# Patient Record
Sex: MALE | Race: WHITE | Employment: FULL TIME | URBAN - NONMETROPOLITAN AREA
[De-identification: names, ages, dates, MRNs, and addresses within clinical notes are randomized per-mention and may not be internally consistent; named-entity substitution may affect disease eponyms.]

---

## 2017-07-30 ENCOUNTER — APPOINTMENT (OUTPATIENT)
Dept: GENERAL RADIOLOGY | Age: 42
End: 2017-07-30

## 2017-07-30 ENCOUNTER — APPOINTMENT (OUTPATIENT)
Dept: CT IMAGING | Age: 42
End: 2017-07-30

## 2017-07-30 ENCOUNTER — HOSPITAL ENCOUNTER (EMERGENCY)
Age: 42
Discharge: HOME | End: 2017-07-30

## 2017-07-30 VITALS
OXYGEN SATURATION: 97 % | HEART RATE: 78 BPM | WEIGHT: 210 LBS | TEMPERATURE: 98.9 F | HEIGHT: 71 IN | DIASTOLIC BLOOD PRESSURE: 88 MMHG | SYSTOLIC BLOOD PRESSURE: 125 MMHG | RESPIRATION RATE: 16 BRPM | BODY MASS INDEX: 29.4 KG/M2

## 2017-07-30 DIAGNOSIS — R10.84 GENERALIZED ABDOMINAL PAIN: Primary | ICD-10-CM

## 2017-07-30 LAB
ABSOLUTE EOS #: 0.1 K/UL (ref 0–0.4)
ABSOLUTE LYMPH #: 1.7 K/UL (ref 1–4.8)
ABSOLUTE MONO #: 0.7 K/UL (ref 0–1)
ALBUMIN SERPL-MCNC: 4.5 G/DL (ref 3.5–5.2)
ALBUMIN/GLOBULIN RATIO: 1.3 (ref 1–2.5)
ALP BLD-CCNC: 52 U/L (ref 40–129)
ALT SERPL-CCNC: 47 U/L (ref 5–41)
ANION GAP SERPL CALCULATED.3IONS-SCNC: 15 MMOL/L (ref 9–17)
AST SERPL-CCNC: 29 U/L
BASOPHILS # BLD: 0 %
BASOPHILS ABSOLUTE: 0 K/UL (ref 0–0.2)
BILIRUB SERPL-MCNC: 0.41 MG/DL (ref 0.3–1.2)
BILIRUBIN URINE: NEGATIVE
BUN BLDV-MCNC: 14 MG/DL (ref 6–20)
BUN/CREAT BLD: 14 (ref 9–20)
CALCIUM SERPL-MCNC: 9.4 MG/DL (ref 8.6–10.4)
CHLORIDE BLD-SCNC: 97 MMOL/L (ref 98–107)
CO2: 23 MMOL/L (ref 20–31)
COLOR: YELLOW
COMMENT UA: NORMAL
CREAT SERPL-MCNC: 0.97 MG/DL (ref 0.7–1.2)
DIFFERENTIAL TYPE: ABNORMAL
EOSINOPHILS RELATIVE PERCENT: 1 %
GFR AFRICAN AMERICAN: >60 ML/MIN
GFR NON-AFRICAN AMERICAN: >60 ML/MIN
GFR SERPL CREATININE-BSD FRML MDRD: ABNORMAL ML/MIN/{1.73_M2}
GFR SERPL CREATININE-BSD FRML MDRD: ABNORMAL ML/MIN/{1.73_M2}
GLUCOSE BLD-MCNC: 115 MG/DL (ref 70–99)
GLUCOSE URINE: NEGATIVE
HCT VFR BLD CALC: 46.6 % (ref 41–53)
HEMOGLOBIN: 15.9 G/DL (ref 13.5–17)
KETONES, URINE: NEGATIVE
LEUKOCYTE ESTERASE, URINE: NEGATIVE
LIPASE: 16 U/L (ref 13–60)
LYMPHOCYTES # BLD: 12 %
MCH RBC QN AUTO: 31.8 PG (ref 26–34)
MCHC RBC AUTO-ENTMCNC: 34.2 G/DL (ref 31–37)
MCV RBC AUTO: 93.2 FL (ref 80–100)
MONOCYTES # BLD: 5 %
NITRITE, URINE: NEGATIVE
PDW BLD-RTO: 12.8 % (ref 12.1–15.2)
PH UA: 7 (ref 5–9)
PLATELET # BLD: 204 K/UL (ref 140–450)
PLATELET ESTIMATE: ABNORMAL
PMV BLD AUTO: 8.2 FL (ref 6–12)
POTASSIUM SERPL-SCNC: 4.4 MMOL/L (ref 3.7–5.3)
PROTEIN UA: NEGATIVE
RBC # BLD: 5 M/UL (ref 4.5–5.9)
RBC # BLD: ABNORMAL 10*6/UL
SEG NEUTROPHILS: 82 %
SEGMENTED NEUTROPHILS ABSOLUTE COUNT: 12.2 K/UL (ref 1.8–7.7)
SODIUM BLD-SCNC: 135 MMOL/L (ref 135–144)
SPECIFIC GRAVITY UA: 1.01 (ref 1.01–1.02)
TOTAL PROTEIN: 8 G/DL (ref 6.4–8.3)
TURBIDITY: CLEAR
URINE HGB: NEGATIVE
UROBILINOGEN, URINE: NORMAL
WBC # BLD: 14.7 K/UL (ref 3.5–11)
WBC # BLD: ABNORMAL 10*3/UL

## 2017-07-30 PROCEDURE — 83690 ASSAY OF LIPASE: CPT

## 2017-07-30 PROCEDURE — 74176 CT ABD & PELVIS W/O CONTRAST: CPT

## 2017-07-30 PROCEDURE — 99284 EMERGENCY DEPT VISIT MOD MDM: CPT

## 2017-07-30 PROCEDURE — 36415 COLL VENOUS BLD VENIPUNCTURE: CPT

## 2017-07-30 PROCEDURE — 81003 URINALYSIS AUTO W/O SCOPE: CPT

## 2017-07-30 PROCEDURE — 85025 COMPLETE CBC W/AUTO DIFF WBC: CPT

## 2017-07-30 PROCEDURE — 80053 COMPREHEN METABOLIC PANEL: CPT

## 2017-07-30 ASSESSMENT — PAIN DESCRIPTION - FREQUENCY: FREQUENCY: INTERMITTENT

## 2017-07-30 ASSESSMENT — PAIN DESCRIPTION - LOCATION: LOCATION: ABDOMEN

## 2017-07-30 ASSESSMENT — ENCOUNTER SYMPTOMS
EYE DISCHARGE: 0
DIARRHEA: 0
TROUBLE SWALLOWING: 0
COUGH: 0
SINUS PRESSURE: 0
ABDOMINAL PAIN: 1
RHINORRHEA: 0
WHEEZING: 0
NAUSEA: 0
BACK PAIN: 0
VOMITING: 0
EYE PAIN: 0

## 2017-07-30 ASSESSMENT — PAIN DESCRIPTION - PAIN TYPE: TYPE: ACUTE PAIN

## 2017-07-30 ASSESSMENT — PAIN SCALES - GENERAL: PAINLEVEL_OUTOF10: 7

## 2017-07-30 ASSESSMENT — PAIN DESCRIPTION - ORIENTATION: ORIENTATION: LEFT;LOWER

## 2017-07-30 ASSESSMENT — PAIN DESCRIPTION - DESCRIPTORS: DESCRIPTORS: CRAMPING

## 2017-07-30 ASSESSMENT — PAIN DESCRIPTION - ONSET: ONSET: GRADUAL

## 2021-04-02 ENCOUNTER — HOSPITAL ENCOUNTER (EMERGENCY)
Age: 46
Discharge: HOME | End: 2021-04-02
Attending: EMERGENCY MEDICINE
Payer: COMMERCIAL

## 2021-04-02 ENCOUNTER — APPOINTMENT (OUTPATIENT)
Dept: GENERAL RADIOLOGY | Age: 46
End: 2021-04-02
Payer: COMMERCIAL

## 2021-04-02 VITALS
SYSTOLIC BLOOD PRESSURE: 140 MMHG | TEMPERATURE: 96.2 F | RESPIRATION RATE: 18 BRPM | OXYGEN SATURATION: 97 % | HEART RATE: 68 BPM | DIASTOLIC BLOOD PRESSURE: 100 MMHG

## 2021-04-02 DIAGNOSIS — F10.20 ALCOHOL DEPENDENCE, UNCOMPLICATED (HCC): Primary | ICD-10-CM

## 2021-04-02 LAB
ABSOLUTE EOS #: 0.17 K/UL (ref 0–0.44)
ABSOLUTE IMMATURE GRANULOCYTE: 0.07 K/UL (ref 0–0.3)
ABSOLUTE LYMPH #: 2.13 K/UL (ref 1.1–3.7)
ABSOLUTE MONO #: 0.78 K/UL (ref 0.1–1.2)
ALBUMIN SERPL-MCNC: 4.4 G/DL (ref 3.5–5.2)
ALBUMIN/GLOBULIN RATIO: 1.5 (ref 1–2.5)
ALP BLD-CCNC: 64 U/L (ref 40–129)
ALT SERPL-CCNC: 31 U/L (ref 5–41)
ANION GAP SERPL CALCULATED.3IONS-SCNC: 10 MMOL/L (ref 9–17)
AST SERPL-CCNC: 24 U/L
BASOPHILS # BLD: 0 % (ref 0–2)
BASOPHILS ABSOLUTE: 0.03 K/UL (ref 0–0.2)
BILIRUB SERPL-MCNC: 0.23 MG/DL (ref 0.3–1.2)
BILIRUBIN URINE: NEGATIVE
BUN BLDV-MCNC: 18 MG/DL (ref 6–20)
BUN/CREAT BLD: 19 (ref 9–20)
CALCIUM SERPL-MCNC: 9.5 MG/DL (ref 8.6–10.4)
CHLORIDE BLD-SCNC: 100 MMOL/L (ref 98–107)
CO2: 25 MMOL/L (ref 20–31)
COLOR: YELLOW
COMMENT UA: ABNORMAL
CREAT SERPL-MCNC: 0.96 MG/DL (ref 0.7–1.2)
D-DIMER QUANTITATIVE: 0.28 MG/L FEU (ref 0–0.59)
DIFFERENTIAL TYPE: ABNORMAL
EKG ATRIAL RATE: 67 BPM
EKG P AXIS: 16 DEGREES
EKG P-R INTERVAL: 170 MS
EKG Q-T INTERVAL: 410 MS
EKG QRS DURATION: 94 MS
EKG QTC CALCULATION (BAZETT): 433 MS
EKG R AXIS: 19 DEGREES
EKG T AXIS: 30 DEGREES
EKG VENTRICULAR RATE: 67 BPM
EOSINOPHILS RELATIVE PERCENT: 2 % (ref 1–4)
GFR AFRICAN AMERICAN: >60 ML/MIN
GFR NON-AFRICAN AMERICAN: >60 ML/MIN
GFR SERPL CREATININE-BSD FRML MDRD: ABNORMAL ML/MIN/{1.73_M2}
GFR SERPL CREATININE-BSD FRML MDRD: ABNORMAL ML/MIN/{1.73_M2}
GLUCOSE BLD-MCNC: 154 MG/DL (ref 70–99)
GLUCOSE URINE: NEGATIVE
HCT VFR BLD CALC: 42.7 % (ref 40.7–50.3)
HEMOGLOBIN: 14.7 G/DL (ref 13–17)
IMMATURE GRANULOCYTES: 1 %
KETONES, URINE: NEGATIVE
LEUKOCYTE ESTERASE, URINE: NEGATIVE
LIPASE: 22 U/L (ref 13–60)
LYMPHOCYTES # BLD: 27 % (ref 24–43)
MCH RBC QN AUTO: 32.2 PG (ref 25.2–33.5)
MCHC RBC AUTO-ENTMCNC: 34.4 G/DL (ref 28.4–34.8)
MCV RBC AUTO: 93.4 FL (ref 82.6–102.9)
MONOCYTES # BLD: 10 % (ref 3–12)
NITRITE, URINE: NEGATIVE
NRBC AUTOMATED: 0 PER 100 WBC
PDW BLD-RTO: 11.9 % (ref 11.8–14.4)
PH UA: 5.5 (ref 5–9)
PLATELET # BLD: 213 K/UL (ref 138–453)
PLATELET ESTIMATE: ABNORMAL
PMV BLD AUTO: 10.2 FL (ref 8.1–13.5)
POTASSIUM SERPL-SCNC: 4.1 MMOL/L (ref 3.7–5.3)
PROTEIN UA: NEGATIVE
RBC # BLD: 4.57 M/UL (ref 4.21–5.77)
RBC # BLD: ABNORMAL 10*6/UL
SEG NEUTROPHILS: 60 % (ref 36–65)
SEGMENTED NEUTROPHILS ABSOLUTE COUNT: 4.68 K/UL (ref 1.5–8.1)
SODIUM BLD-SCNC: 135 MMOL/L (ref 135–144)
SPECIFIC GRAVITY UA: >1.03 (ref 1.01–1.02)
TOTAL PROTEIN: 7.4 G/DL (ref 6.4–8.3)
TURBIDITY: CLEAR
URINE HGB: NEGATIVE
UROBILINOGEN, URINE: NORMAL
WBC # BLD: 7.9 K/UL (ref 3.5–11.3)
WBC # BLD: ABNORMAL 10*3/UL

## 2021-04-02 PROCEDURE — 71045 X-RAY EXAM CHEST 1 VIEW: CPT

## 2021-04-02 PROCEDURE — 99285 EMERGENCY DEPT VISIT HI MDM: CPT

## 2021-04-02 PROCEDURE — 80053 COMPREHEN METABOLIC PANEL: CPT

## 2021-04-02 PROCEDURE — 93010 ELECTROCARDIOGRAM REPORT: CPT | Performed by: INTERNAL MEDICINE

## 2021-04-02 PROCEDURE — 85379 FIBRIN DEGRADATION QUANT: CPT

## 2021-04-02 PROCEDURE — 36415 COLL VENOUS BLD VENIPUNCTURE: CPT

## 2021-04-02 PROCEDURE — 81003 URINALYSIS AUTO W/O SCOPE: CPT

## 2021-04-02 PROCEDURE — 83690 ASSAY OF LIPASE: CPT

## 2021-04-02 PROCEDURE — 93005 ELECTROCARDIOGRAM TRACING: CPT | Performed by: EMERGENCY MEDICINE

## 2021-04-02 PROCEDURE — 85025 COMPLETE CBC W/AUTO DIFF WBC: CPT

## 2021-04-02 ASSESSMENT — ENCOUNTER SYMPTOMS
ALLERGIC/IMMUNOLOGIC NEGATIVE: 1
GASTROINTESTINAL NEGATIVE: 1
RESPIRATORY NEGATIVE: 1
EYES NEGATIVE: 1

## 2021-04-02 ASSESSMENT — PAIN DESCRIPTION - LOCATION: LOCATION: ABDOMEN

## 2021-04-02 ASSESSMENT — PAIN DESCRIPTION - DESCRIPTORS: DESCRIPTORS: ACHING

## 2021-04-02 ASSESSMENT — PAIN SCALES - GENERAL: PAINLEVEL_OUTOF10: 7

## 2021-04-02 ASSESSMENT — PAIN DESCRIPTION - PAIN TYPE: TYPE: ACUTE PAIN

## 2021-04-02 ASSESSMENT — PAIN DESCRIPTION - ORIENTATION: ORIENTATION: RIGHT;LEFT

## 2021-04-02 NOTE — ED PROVIDER NOTES
[address] [place] [address] ED  Emergency Department     Care of Gisela Huang was assumed from previous ED provider. The patient's initial evaluation and plan have been discussed with the ED prior provider who initially cared for the patient . All pertinent data has been reviewed. Time of signout is 0700. This patient is a 39 y.o. Male with bilateral episodic flank pain, currently completely symptom-free. Presents today with some intermittent episodes of bilateral flank discomfort which is very intermittent and seems to be associated with movement. No fever, no chills, no hematuria or dysuria, no chest pain, no shortness of breath, no hemoptysis or any other associated symptoms. The patient had labs and EKG and chest x-ray ordered and I am asked to follow-up on these tests    On my examination, patient is pleasant, not in any acute distress, denies any complaints currently. He does describe that he drinks 6 beers a day and has done so for a long time. He uses to help him fall asleep and is somewhat anxious about cutting down, although he feels that it is time to do so. He denies any dark or bloody stools. Denies any other associated symptoms. He has not had any weight loss    HEENT: WNL  Lungs: Clear and equal bilaterally. No increased work of breathing or respiratory distress. Heart: Rate and rhythm regular, no murmurs clicks or gallops  Abdomen: Soft, nondistended, nontender; flank and back is nontender on palpation bilaterally with no lesions  Lower extremities: no edema, negative Homans bilaterally  Neuro: Awake and alert, no lateralizing deficits, cranial nerves II through XII grossly intact bilaterally    EMERGENCY DEPARTMENT COURSE / MEDICAL DECISION MAKING:       MEDICATIONS GIVEN:  No orders of the defined types were placed in this encounter.     LABS / RADIOLOGY:     Results for orders placed or performed during the hospital encounter of 04/02/21   CBC Auto Differential   Result Value Ref Range    WBC 7.9 3.5 - 11.3 k/uL    RBC 4.57 4.21 - 5.77 m/uL    Hemoglobin 14.7 13.0 - 17.0 g/dL    Hematocrit 42.7 40.7 - 50.3 %    MCV 93.4 82.6 - 102.9 fL    MCH 32.2 25.2 - 33.5 pg    MCHC 34.4 28.4 - 34.8 g/dL    RDW 11.9 11.8 - 14.4 %    Platelets 475 734 - 111 k/uL    MPV 10.2 8.1 - 13.5 fL    NRBC Automated 0.0 0.0 per 100 WBC    Differential Type NOT REPORTED     Seg Neutrophils 60 36 - 65 %    Lymphocytes 27 24 - 43 %    Monocytes 10 3 - 12 %    Eosinophils % 2 1 - 4 %    Basophils 0 0 - 2 %    Immature Granulocytes 1 (H) 0 %    Segs Absolute 4.68 1.50 - 8.10 k/uL    Absolute Lymph # 2.13 1.10 - 3.70 k/uL    Absolute Mono # 0.78 0.10 - 1.20 k/uL    Absolute Eos # 0.17 0.00 - 0.44 k/uL    Basophils Absolute 0.03 0.00 - 0.20 k/uL    Absolute Immature Granulocyte 0.07 0.00 - 0.30 k/uL    WBC Morphology NOT REPORTED     RBC Morphology NOT REPORTED     Platelet Estimate NOT REPORTED    Comprehensive Metabolic Panel w/ Reflex to MG   Result Value Ref Range    Glucose 154 (H) 70 - 99 mg/dL    BUN 18 6 - 20 mg/dL    CREATININE 0.96 0.70 - 1.20 mg/dL    Bun/Cre Ratio 19 9 - 20    Calcium 9.5 8.6 - 10.4 mg/dL    Sodium 135 135 - 144 mmol/L    Potassium 4.1 3.7 - 5.3 mmol/L    Chloride 100 98 - 107 mmol/L    CO2 25 20 - 31 mmol/L    Anion Gap 10 9 - 17 mmol/L    Alkaline Phosphatase 64 40 - 129 U/L    ALT 31 5 - 41 U/L    AST 24 <40 U/L    Total Bilirubin 0.23 (L) 0.3 - 1.2 mg/dL    Total Protein 7.4 6.4 - 8.3 g/dL    Albumin 4.4 3.5 - 5.2 g/dL    Albumin/Globulin Ratio 1.5 1.0 - 2.5    GFR Non-African American >60 >60 mL/min    GFR African American >60 >60 mL/min    GFR Comment          GFR Staging         Lipase   Result Value Ref Range    Lipase 22 13 - 60 U/L   D-Dimer, Quantitative   Result Value Ref Range    D-Dimer, Quant 0.28 0.00 - 0.59 mg/L FEU   Urinalysis, reflex to microscopic   Result Value Ref Range    Color, UA YELLOW YELLOW    Turbidity UA CLEAR CLEAR    Glucose, Ur NEGATIVE NEGATIVE    Bilirubin follow-up for further evaluation and return if he develops any worsening of his symptoms, persistency of symptoms, or any red flags such as dark or bloody stools, coughing up blood, shortness of breath, chest pain, unintentional weight loss. CLINICAL IMPRESSION      1.  Alcohol dependence, uncomplicated [facility])          DISPOSITION/PLAN   DISPOSITION Decision To Discharge 04/02/2021 07:22:27 AM      PATIENT REFERRED TO:  [place] [id number] [phone]            DISCHARGE MEDICATIONS:  New Prescriptions    No medications on file              (Please note that portions of this note were completed with a voice recognition program.  Efforts were made to edit the dictations but occasionally words are mis-transcribed.)      James Mcmahon DO (electronically signed)  Attending Emergency Physician          Arvind Mckinley DO  04/02/21 2781

## 2021-04-02 NOTE — ED PROVIDER NOTES
Pulse Resp SpO2 Height Weight   (!) 153/93 96.2 °F (35.7 °C) -- 68 18 97 % -- --       Physical Exam  Vitals signs and nursing note reviewed. Constitutional:       Appearance: He is well-developed. HENT:      Head: Normocephalic and atraumatic. Mouth/Throat:      Mouth: Mucous membranes are moist.   Cardiovascular:      Rate and Rhythm: Normal rate and regular rhythm. Heart sounds: Normal heart sounds. No murmur. Pulmonary:      Effort: Pulmonary effort is normal.      Breath sounds: Normal breath sounds. Abdominal:      General: Abdomen is protuberant. Bowel sounds are normal. There is no abdominal bruit. There are no signs of injury. Palpations: Abdomen is soft. There is no shifting dullness, fluid wave, hepatomegaly, splenomegaly, mass or pulsatile mass. Tenderness: There is abdominal tenderness (Bilateral flank). There is right CVA tenderness and left CVA tenderness. Comments: Tender Holt bilateral flank regions-negative Cullens  sign   Skin:     General: Skin is warm. Capillary Refill: Capillary refill takes less than 2 seconds. Findings: No erythema or rash. Neurological:      General: No focal deficit present. Mental Status: He is alert. DIAGNOSTIC RESULTS     EKG: All EKG's are interpreted by the Emergency Department Physician who either signs or Co-signs this chart in the absence of a cardiologist.      RADIOLOGY:   Non-plain film images such as CT, Ultrasound and MRI are read by the radiologist. Plain radiographic images are visualized and preliminarily interpreted by the emergency physician with the below findings:      Interpretation per the Radiologist below, if available at the time of this note:    XR CHEST PORTABLE   Preliminary Result   No acute cardiopulmonary disease.                ED BEDSIDE ULTRASOUND:   Performed by ED Physician - none    LABS:  Labs Reviewed   CBC WITH AUTO DIFFERENTIAL - Abnormal; Notable for the following components:       Result Value    Immature Granulocytes 1 (*)     All other components within normal limits   COMPREHENSIVE METABOLIC PANEL W/ REFLEX TO MG FOR LOW K - Abnormal; Notable for the following components:    Glucose 154 (*)     Total Bilirubin 0.23 (*)     All other components within normal limits   URINALYSIS - Abnormal; Notable for the following components:    Specific Gravity, UA >1.030 (*)     All other components within normal limits   LIPASE   D-DIMER, QUANTITATIVE       All other labs were within normal range or not returned as of this dictation. EMERGENCY DEPARTMENT COURSE and DIFFERENTIAL DIAGNOSIS/MDM:   Vitals:    Vitals:    04/02/21 0714 04/02/21 0715 04/02/21 0729 04/02/21 0730   BP:  (!) 136/97  (!) 140/100   Pulse:       Resp:       Temp:       SpO2: 96%  97%          MDM  Number of Diagnoses or Management Options  Alcohol dependence, uncomplicated ([facility] [address]  Diagnosis management comments: 46-year-old gentleman presented to emergency department with gradual onset of bilateral flank discomfort and lower chest discomfort bilateral.  Discomfort is made worse with movement as patient rolls from left to right side. No history for trauma    Baseline laboratory studies have been requested care of the patient transferred to Dr. Sabine Robledo at change of shift        REASSESSMENT       ED Course as of Apr 03 0519   Fri Apr 02, 2021   0467 Performed at 634 ventricular rate 67-sinus rhythm-AL interval 0.170-QRS duration 0.094-QTc corrected 0.433-no definitive ischemic or infarct changes appreciated   EKG 12 Lead [RS]   Sat Apr 03, 2021   0517 Chest x-ray no acute process radiologist read    [RS]      ED Course User Index  [RS] Robel Crum MD         CRITICAL CARE TIME   Total Critical Care time was minutes, excluding separately reportable procedures.   There was a high probability of clinically significant/life threatening deterioration in the patient's condition which required my urgent intervention. CONSULTS:  None    PROCEDURES:  Unless otherwise noted below, none     Procedures    FINAL IMPRESSION      1. Alcohol dependence, uncomplicated [facility])          DISPOSITION/PLAN   DISPOSITION Decision To Discharge 04/02/2021 07:22:27 AM      PATIENT REFERRED TO:  Elvis Burt MD  [facility]  [address]  [facility] [address] [phone]            DISCHARGE MEDICATIONS:  There are no discharge medications for this patient. Controlled Substances Monitoring:     No flowsheet data found.     (Please note that portions of this note were completed with a voice recognition program.  Efforts were made to edit the dictations but occasionally words are mis-transcribed.)    Lance Cobos MD (electronically signed)  Attending Emergency Physician            Lance Cobos MD  04/03/21 [id number]

## 2021-04-02 NOTE — LETTER
[facility] ED  [address] [place] [address] [place] [address]  Phone: [phone]  Fax: [phone]             April 2, 2021    Patient: Valentin Webber   YOB: 1975   Date of Visit: 4/2/2021       To Whom It May Concern:    Willian Hdez was seen and treated in our emergency department on 4/2/2021. He may return to work on 04/03/2021.       Sincerely,             Signature:__________________________________

## 2022-02-27 ENCOUNTER — HOSPITAL ENCOUNTER (EMERGENCY)
Age: 47
Discharge: HOME | End: 2022-02-27
Attending: EMERGENCY MEDICINE
Payer: OTHER GOVERNMENT

## 2022-02-27 VITALS
OXYGEN SATURATION: 96 % | DIASTOLIC BLOOD PRESSURE: 90 MMHG | HEART RATE: 78 BPM | SYSTOLIC BLOOD PRESSURE: 138 MMHG | RESPIRATION RATE: 18 BRPM | HEIGHT: 71 IN | TEMPERATURE: 97.3 F | BODY MASS INDEX: 30.8 KG/M2 | WEIGHT: 220 LBS

## 2022-02-27 DIAGNOSIS — E11.9 DIABETES MELLITUS, NEW ONSET (HCC): Primary | ICD-10-CM

## 2022-02-27 LAB
ABSOLUTE EOS #: 0.14 K/UL (ref 0–0.44)
ABSOLUTE IMMATURE GRANULOCYTE: 0.05 K/UL (ref 0–0.3)
ABSOLUTE LYMPH #: 1.83 K/UL (ref 1.1–3.7)
ABSOLUTE MONO #: 0.71 K/UL (ref 0.1–1.2)
ALBUMIN SERPL-MCNC: 4.6 G/DL (ref 3.5–5.2)
ALBUMIN/GLOBULIN RATIO: 1.4 (ref 1–2.5)
ALLEN TEST: NORMAL
ALP BLD-CCNC: 72 U/L (ref 40–129)
ALT SERPL-CCNC: 24 U/L (ref 5–41)
ANION GAP SERPL CALCULATED.3IONS-SCNC: 14 MMOL/L (ref 9–17)
AST SERPL-CCNC: 21 U/L
BASOPHILS # BLD: 1 % (ref 0–2)
BASOPHILS ABSOLUTE: 0.04 K/UL (ref 0–0.2)
BILIRUB SERPL-MCNC: 0.41 MG/DL (ref 0.3–1.2)
BUN BLDV-MCNC: 16 MG/DL (ref 6–20)
BUN/CREAT BLD: 21 (ref 9–20)
CALCIUM SERPL-MCNC: 9.6 MG/DL (ref 8.6–10.4)
CHLORIDE BLD-SCNC: 98 MMOL/L (ref 98–107)
CO2: 22 MMOL/L (ref 20–31)
CREAT SERPL-MCNC: 0.77 MG/DL (ref 0.7–1.2)
EOSINOPHILS RELATIVE PERCENT: 2 % (ref 1–4)
GFR AFRICAN AMERICAN: >60 ML/MIN
GFR NON-AFRICAN AMERICAN: >60 ML/MIN
GFR SERPL CREATININE-BSD FRML MDRD: ABNORMAL ML/MIN/{1.73_M2}
GFR SERPL CREATININE-BSD FRML MDRD: ABNORMAL ML/MIN/{1.73_M2}
GLUCOSE BLD-MCNC: 322 MG/DL (ref 70–99)
GLUCOSE BLD-MCNC: 331 MG/DL (ref 74–100)
HCO3 VENOUS: 26.3 MMOL/L (ref 24–30)
HCT VFR BLD CALC: 42.8 % (ref 40.7–50.3)
HEMOGLOBIN: 15.2 G/DL (ref 13–17)
IMMATURE GRANULOCYTES: 1 %
LYMPHOCYTES # BLD: 25 % (ref 24–43)
MCH RBC QN AUTO: 32.3 PG (ref 25.2–33.5)
MCHC RBC AUTO-ENTMCNC: 35.5 G/DL (ref 28.4–34.8)
MCV RBC AUTO: 90.9 FL (ref 82.6–102.9)
MONOCYTES # BLD: 10 % (ref 3–12)
NRBC AUTOMATED: 0 PER 100 WBC
O2 SAT, VEN: 71.2 % (ref 60–85)
PATIENT TEMP: 37
PCO2, VEN: 42.4 (ref 39–55)
PDW BLD-RTO: 11.9 % (ref 11.8–14.4)
PH VENOUS: 7.41 (ref 7.32–7.42)
PLATELET # BLD: 201 K/UL (ref 138–453)
PMV BLD AUTO: 10.4 FL (ref 8.1–13.5)
PO2, VEN: 37.1 (ref 30–50)
POSITIVE BASE EXCESS, VEN: 1.5 MMOL/L (ref 0–2)
POTASSIUM SERPL-SCNC: 4.1 MMOL/L (ref 3.7–5.3)
PT. POSITION: NORMAL
RBC # BLD: 4.71 M/UL (ref 4.21–5.77)
SAMPLE SITE: NORMAL
SARS-COV-2, RAPID: NOT DETECTED
SEG NEUTROPHILS: 61 % (ref 36–65)
SEGMENTED NEUTROPHILS ABSOLUTE COUNT: 4.53 K/UL (ref 1.5–8.1)
SODIUM BLD-SCNC: 134 MMOL/L (ref 135–144)
SPECIMEN DESCRIPTION: NORMAL
TOTAL PROTEIN: 7.9 G/DL (ref 6.4–8.3)
WBC # BLD: 7.3 K/UL (ref 3.5–11.3)

## 2022-02-27 PROCEDURE — 2580000003 HC RX 258: Performed by: EMERGENCY MEDICINE

## 2022-02-27 PROCEDURE — 85025 COMPLETE CBC W/AUTO DIFF WBC: CPT

## 2022-02-27 PROCEDURE — 36415 COLL VENOUS BLD VENIPUNCTURE: CPT

## 2022-02-27 PROCEDURE — 82805 BLOOD GASES W/O2 SATURATION: CPT

## 2022-02-27 PROCEDURE — 99283 EMERGENCY DEPT VISIT LOW MDM: CPT

## 2022-02-27 PROCEDURE — 82947 ASSAY GLUCOSE BLOOD QUANT: CPT

## 2022-02-27 PROCEDURE — 87635 SARS-COV-2 COVID-19 AMP PRB: CPT

## 2022-02-27 PROCEDURE — 80053 COMPREHEN METABOLIC PANEL: CPT

## 2022-02-27 PROCEDURE — 83036 HEMOGLOBIN GLYCOSYLATED A1C: CPT

## 2022-02-27 RX ORDER — 0.9 % SODIUM CHLORIDE 0.9 %
1000 INTRAVENOUS SOLUTION INTRAVENOUS ONCE
Status: COMPLETED | OUTPATIENT
Start: 2022-02-27 | End: 2022-02-27

## 2022-02-27 RX ADMIN — SODIUM CHLORIDE 1000 ML: 9 INJECTION, SOLUTION INTRAVENOUS at 14:40

## 2022-02-27 ASSESSMENT — ENCOUNTER SYMPTOMS
VOMITING: 0
ABDOMINAL PAIN: 0
SHORTNESS OF BREATH: 0
NAUSEA: 0

## 2022-02-27 ASSESSMENT — VISUAL ACUITY: OU: 1

## 2022-02-27 NOTE — ED PROVIDER NOTES
[facility] ED  EMERGENCY DEPARTMENT ENCOUNTER      Pt Name: Charmaine Aguilar  MRN: [id number]  [place] 1975  Date of evaluation: 2/27/2022  Provider: Makayla Allred MD    [address] [place] [address]       Chief Complaint   Patient presents with    Blurred Vision     Blurred distant vision, progressive over past 3 days         HISTORY OF PRESENT ILLNESS   (Location/Symptom, Timing/Onset, Context/Setting, Quality, Duration, Modifying Factors, Severity)  Note limiting factors. Charmaine Aguilar is a 42 y.o. male who presents to the emergency department for evaluation of blurred vision. States that he has noted some blurred vision, particularly at a distance, over the past 3 days. States that he has felt fatigued recently as well, similar to when he had COVID about 1.5 months ago. No other complaints. Nursing Notes were reviewed. REVIEW OF SYSTEMS    (2-9 systems for level 4, 10 or more for level 5)     Review of Systems   Constitutional: Negative for fever. HENT: Negative. Eyes: Positive for visual disturbance (Blurred, at a distance). Respiratory: Negative for shortness of breath. Cardiovascular: Negative for chest pain. Gastrointestinal: Negative for abdominal pain, nausea and vomiting. Neurological: Negative for weakness, numbness and headaches. Except as noted above the remainder of the review of systems was reviewed and negative.        PAST MEDICAL HISTORY     Past Medical History:   Diagnosis Date    Anxiety     Depression     Hyperlipidemia      HTN      SURGICAL HISTORY       Past Surgical History:   Procedure Laterality Date    HERNIA REPAIR Right     inguinal    LYMPH NODE BIOPSY           CURRENT MEDICATIONS       Denies    ALLERGIES     Bee venom    FAMILY HISTORY     DM, HLD       SOCIAL HISTORY       Social History     Socioeconomic History    Marital status:      Spouse name: Not on file    Number of children: Not on file    Years of education: Not on file    Highest education level: Not on file   Occupational History    Not on file   Tobacco Use    Smoking status: Former Smoker     Packs/day: 0.50    Smokeless tobacco: Current User     Types: Chew   Substance and Sexual Activity    Alcohol use: Yes     Alcohol/week: 6.0 standard drinks     Types: 6 Cans of beer per week     Comment: 6 beers daily    Drug use: No    Sexual activity: Not on file   Other Topics Concern    Not on file   Social History Narrative    Not on file     Social Determinants of Health     Financial Resource Strain:     Difficulty of Paying Living Expenses: Not on file   Food Insecurity:     Worried About Running Out of Food in the Last Year: Not on file    Agusto of Food in the Last Year: Not on file   Transportation Needs:     Lack of Transportation (Medical): Not on file    Lack of Transportation (Non-Medical):  Not on file   Physical Activity:     Days of Exercise per Week: Not on file    Minutes of Exercise per Session: Not on file   Stress:     Feeling of Stress : Not on file   Social Connections:     Frequency of Communication with Friends and Family: Not on file    Frequency of Social Gatherings with Friends and Family: Not on file    Attends [employer] Services: Not on file    Active Member of [address] [place] [address] [employer] or Organizations: Not on file    Attends Club or Organization Meetings: Not on file    Marital Status: Not on file   Intimate Partner Violence:     Fear of Current or Ex-Partner: Not on file    Emotionally Abused: Not on file    Physically Abused: Not on file    Sexually Abused: Not on file   Housing Stability:     Unable to Pay for Housing in the Last Year: Not on file    Number of [place] in the Last Year: Not on file    Unstable Housing in the Last Year: Not on file       PHYSICAL EXAM    (up to 7 for level 4, 8 or more for level 5)     ED Triage Vitals [02/27/22 1333]   BP Temp Temp Source Pulse Resp SpO2 Height Weight   (!) 141/99 97.3 °F (36.3 °C) Tympanic 78 14 96 % 5' 11\" (1.803 m) 220 lb (99.8 kg)       Physical Exam  Vitals reviewed. Constitutional:       General: He is not in acute distress. Appearance: He is not ill-appearing, toxic-appearing or diaphoretic. HENT:      Head: Normocephalic and atraumatic. Nose: Nose normal.      Mouth/Throat:      Mouth: Mucous membranes are dry. Pharynx: Oropharynx is clear. No oropharyngeal exudate or posterior oropharyngeal erythema. Eyes:      General: Lids are normal. Vision grossly intact. No scleral icterus. Right eye: No discharge. Left eye: No discharge. Extraocular Movements: Extraocular movements intact. Conjunctiva/sclera: Conjunctivae normal.      Right eye: Right conjunctiva is not injected. No chemosis or exudate. Left eye: Left conjunctiva is not injected. No chemosis or exudate. Pupils: Pupils are equal, round, and reactive to light. Comments: Visual acuity:    OD - 20/30  OS - 20/25  OU - 20/20   Cardiovascular:      Rate and Rhythm: Normal rate and regular rhythm. Heart sounds: No murmur heard. Pulmonary:      Effort: Pulmonary effort is normal. No respiratory distress. Breath sounds: Normal breath sounds. No stridor. No wheezing, rhonchi or rales. Abdominal:      General: There is no distension. Palpations: Abdomen is soft. There is no mass. Tenderness: There is no abdominal tenderness. There is no guarding. Musculoskeletal:      Cervical back: Neck supple. Skin:     General: Skin is warm and dry. Coloration: Skin is not jaundiced or pale. Neurological:      General: No focal deficit present. Mental Status: He is alert and oriented to person, place, and time. Sensory: No sensory deficit. Motor: No weakness. Gait: Gait is intact.    Psychiatric:         Mood and Affect: Mood normal.         Behavior: Behavior normal.         DIAGNOSTIC RESULTS       LABS:  Labs Reviewed   GLUCOSE, WHOLE BLOOD - Abnormal; Notable for the following components:       Result Value    POC Glucose 331 (*)     All other components within normal limits   CBC WITH AUTO DIFFERENTIAL - Abnormal; Notable for the following components:    MCHC 35.5 (*)     Immature Granulocytes 1 (*)     All other components within normal limits   COMPREHENSIVE METABOLIC PANEL W/ REFLEX TO MG FOR LOW K - Abnormal; Notable for the following components:    Glucose 322 (*)     Bun/Cre Ratio 21 (*)     Sodium 134 (*)     All other components within normal limits   COVID-19, RAPID   BLOOD GAS, VENOUS   HEMOGLOBIN A1C   POCT GLUCOSE       All other labs were within normal range or not returned as of this dictation. EMERGENCY DEPARTMENT COURSE and DIFFERENTIAL DIAGNOSIS/MDM:   Vitals:    Vitals:    02/27/22 1515 02/27/22 1530 02/27/22 1545 02/27/22 1551   BP: (!) 133/98 136/87 (!) 138/90    Pulse: 83 84 78    Resp: 26 13 23 18   Temp:       TempSrc:       SpO2: 98% 97% 96%    Weight:       Height:           Patient presented c/o fatigue and blurred vision. In ED, VA is normal. Exam unremarkable. No neuro deficits. Noted on POCT glucose to be hyperglycemic. Subsequent lab studies demonstrated no anion gap, normal pH and no other findings of concern. Plan at this time is for discharge home with outpatient follow-up. Will start metformin 500mg daily. Discussed with Dr. Willis Later to ensure that patient could get ASAP outpatient follow-up, as he has no PCP; advised to call Dr. Markus Cruz office in am to schedule. Patient given initial DM counseling and diet advice as well as return precautions. No suggestion of underlying neurologic cause or other significant concern. FINAL IMPRESSION      1.  Diabetes mellitus, new onset [facility])          DISPOSITION/PLAN   DISPOSITION Decision To Discharge 02/27/2022 03:15:19 PM      PATIENT REFERRED TO:  Mary Bethea MD  [address] [place] [address] Dr. Sal Rizvi [phone]    Schedule an appointment as soon as possible for a visit in 1 day      (Please note that portions of this note were completed with a voice recognition program.  Efforts were made to edit the dictations but occasionally words are mis-transcribed.)    Jo Jose MD (electronically signed)  Attending Emergency Physician            Jo Jose MD  02/27/22 [id number]

## 2022-02-28 LAB
ESTIMATED AVERAGE GLUCOSE: 246 MG/DL
GLUCOSE BLD-MCNC: 342 MG/DL (ref 74–100)
HBA1C MFR BLD: 10.2 % (ref 4–6)

## 2022-04-11 ENCOUNTER — OFFICE VISIT (OUTPATIENT)
Dept: PRIMARY CARE CLINIC | Age: 47
End: 2022-04-11
Payer: COMMERCIAL

## 2022-04-11 VITALS
OXYGEN SATURATION: 97 % | TEMPERATURE: 98.3 F | SYSTOLIC BLOOD PRESSURE: 124 MMHG | HEART RATE: 72 BPM | BODY MASS INDEX: 32.53 KG/M2 | DIASTOLIC BLOOD PRESSURE: 85 MMHG | HEIGHT: 71 IN | RESPIRATION RATE: 18 BRPM | WEIGHT: 232.4 LBS

## 2022-04-11 DIAGNOSIS — Z13.220 LIPID SCREENING: ICD-10-CM

## 2022-04-11 DIAGNOSIS — E11.9 TYPE 2 DIABETES MELLITUS WITHOUT COMPLICATION, WITHOUT LONG-TERM CURRENT USE OF INSULIN (HCC): ICD-10-CM

## 2022-04-11 DIAGNOSIS — Z76.89 ENCOUNTER TO ESTABLISH CARE: Primary | ICD-10-CM

## 2022-04-11 LAB — HBA1C MFR BLD: 11.1 %

## 2022-04-11 PROCEDURE — 83036 HEMOGLOBIN GLYCOSYLATED A1C: CPT | Performed by: NURSE PRACTITIONER

## 2022-04-11 PROCEDURE — 3046F HEMOGLOBIN A1C LEVEL >9.0%: CPT | Performed by: NURSE PRACTITIONER

## 2022-04-11 PROCEDURE — 99214 OFFICE O/P EST MOD 30 MIN: CPT | Performed by: NURSE PRACTITIONER

## 2022-04-11 RX ORDER — LANCETS
1 EACH MISCELLANEOUS 2 TIMES DAILY
Qty: 180 EACH | Refills: 3 | Status: SHIPPED | OUTPATIENT
Start: 2022-04-11 | End: 2022-07-10

## 2022-04-11 RX ORDER — BLOOD-GLUCOSE METER
1 KIT MISCELLANEOUS DAILY
Qty: 1 KIT | Refills: 0 | Status: SHIPPED | OUTPATIENT
Start: 2022-04-11

## 2022-04-11 SDOH — ECONOMIC STABILITY: FOOD INSECURITY: WITHIN THE PAST 12 MONTHS, YOU WORRIED THAT YOUR FOOD WOULD RUN OUT BEFORE YOU GOT MONEY TO BUY MORE.: NEVER TRUE

## 2022-04-11 SDOH — ECONOMIC STABILITY: FOOD INSECURITY: WITHIN THE PAST 12 MONTHS, THE FOOD YOU BOUGHT JUST DIDN'T LAST AND YOU DIDN'T HAVE MONEY TO GET MORE.: NEVER TRUE

## 2022-04-11 ASSESSMENT — PATIENT HEALTH QUESTIONNAIRE - PHQ9
2. FEELING DOWN, DEPRESSED OR HOPELESS: 0
SUM OF ALL RESPONSES TO PHQ QUESTIONS 1-9: 0
SUM OF ALL RESPONSES TO PHQ QUESTIONS 1-9: 0
SUM OF ALL RESPONSES TO PHQ9 QUESTIONS 1 & 2: 0
1. LITTLE INTEREST OR PLEASURE IN DOING THINGS: 0
SUM OF ALL RESPONSES TO PHQ QUESTIONS 1-9: 0
SUM OF ALL RESPONSES TO PHQ QUESTIONS 1-9: 0

## 2022-04-11 ASSESSMENT — SOCIAL DETERMINANTS OF HEALTH (SDOH): HOW HARD IS IT FOR YOU TO PAY FOR THE VERY BASICS LIKE FOOD, HOUSING, MEDICAL CARE, AND HEATING?: NOT HARD AT ALL

## 2022-04-11 ASSESSMENT — ENCOUNTER SYMPTOMS
GASTROINTESTINAL NEGATIVE: 1
ALLERGIC/IMMUNOLOGIC NEGATIVE: 1
EYES NEGATIVE: 1
RESPIRATORY NEGATIVE: 1

## 2022-04-11 NOTE — PATIENT INSTRUCTIONS
SURVEY:     You may be receiving a survey from [employer] regarding your visit today. Please complete the survey to enable us to provide the highest quality of care to you and your family. If you cannot score us a very good on any question, please call the office to discuss how we could have made your experience a very good one. Thank you.   Bao Meyer, APRN-ELLYN Glover, CNP  Farida Adames, LPN  Munira Ma, LPN  Darryl Bender, CMA  Niharika Merino, JENI Vasques, CMA  Leticia, PCA

## 2022-04-11 NOTE — PROGRESS NOTES
[facility]  Jen Hernandez CNP  [facility] PRIMARY CARE  [address]  Dept: [phone]  Dept Fax: [phone]      Name: Nuno Rodas  : 1975         Chief Complaint:     Chief Complaint   Patient presents with    New Patient     pt states no previous pcp -wants to discuss high blood pressure and high sugar levels        History of Present Illness:      Nuno Rodas is a 42 y.o.  male who presents with New Patient (pt states no previous pcp -wants to discuss high blood pressure and high sugar levels )    Rani De Los Santos is here today for a new patient appointment to establish care and has not seen a PCP since he was a child. Rani De Los Santos was seen in the ER on 22 and diagnosed with Type 2 diabetes. He was prescribed Metformin daily that he has not started taking. He states he was afraid it would make him sick because someone at work told him it would. He does state that he used to drink 3-4 [employer] a day and he has stopped drinking that. He does drink 4-5 beers a day. He states he does have a poor diet and will sometimes go all day without eating. He denies any episodes of hypoglycemia. He denies any immediate family history of diabetes. Rani De Los Santos denies any vision changes today but that was what brought him into the ER. He has not followed up with an eye dr. And this was encouraged today. He states he does have polydipsia and polyuria.         Past Medical History:     Past Medical History:   Diagnosis Date    Anxiety     Depression     Hyperlipidemia       Reviewed all health maintenance requirements and ordered appropriate tests  Health Maintenance Due   Topic Date Due    Hepatitis C screen  Never done    Diabetic foot exam  Never done    Lipid screen  Never done    Depression Screen  Never done    HIV screen  Never done    Diabetic microalbuminuria test  Never done       Past Surgical History:     Past Surgical History:   Procedure Laterality Date  HERNIA REPAIR Right     inguinal    LYMPH NODE BIOPSY          Medications:       Prior to Admission medications    Medication Sig Start Date End Date Taking? Authorizing Provider   metFORMIN (GLUCOPHAGE) 500 MG tablet Take 1 tablet by mouth 2 times daily (with meals) 4/11/22 6/10/22 Yes EMILY Barnes CNP   glucose monitoring (FREESTYLE FREEDOM) kit 1 kit by Does not apply route daily 4/11/22  Yes EMILY Barnes CNP   Accu-Chek Multiclix Lancets MISC 1 dose pack by Does not apply route 2 times daily 4/11/22 7/10/22 Yes EMILY Barnes CNP        Allergies:       Bee venom    Social History:     Tobacco:    reports that he has quit smoking. He smoked 0.50 packs per day. His smokeless tobacco use includes chew. Alcohol:      reports current alcohol use of about 6.0 standard drinks of alcohol per week. Drug Use:  reports no history of drug use. Family History:     Family History   Problem Relation Age of Onset    Other Mother         thyroid        Review of Systems:     Positive and Negative as described in HPI    Review of Systems   Constitutional: Negative. HENT: Negative. Eyes: Negative. Respiratory: Negative. Cardiovascular: Negative. Gastrointestinal: Negative. Endocrine: Positive for polydipsia and polyuria. Genitourinary: Negative. Musculoskeletal: Negative. Skin: Negative. Allergic/Immunologic: Negative. Neurological: Negative. Hematological: Negative. Psychiatric/Behavioral: Negative. Physical Exam:   Vitals:  /85 (Site: Left Upper Arm, Position: Sitting)   Pulse 72   Temp 98.3 °F (36.8 °C) (Temporal)   Resp 18   Ht 5' 11\" (1.803 m)   Wt 232 lb 6.4 oz (105.4 kg)   SpO2 97%   BMI 32.41 kg/m²     Physical Exam  Vitals and nursing note reviewed. Constitutional:       Appearance: Normal appearance. HENT:      Head: Normocephalic.       Right Ear: External ear normal.      Left Ear: External ear normal.      Nose: Nose normal.      Mouth/Throat:      Mouth: Mucous membranes are moist.      Pharynx: Oropharynx is clear. Eyes:      Conjunctiva/sclera: Conjunctivae normal.      Pupils: Pupils are equal, round, and reactive to light. Cardiovascular:      Rate and Rhythm: Normal rate and regular rhythm. Heart sounds: Normal heart sounds. No murmur heard. Pulmonary:      Effort: Pulmonary effort is normal.      Breath sounds: Normal breath sounds. Musculoskeletal:         General: Normal range of motion. Cervical back: Normal range of motion. Lymphadenopathy:      Cervical: No cervical adenopathy. Skin:     General: Skin is warm. Capillary Refill: Capillary refill takes less than 2 seconds. Neurological:      General: No focal deficit present. Mental Status: He is alert and oriented to person, place, and time. Psychiatric:         Mood and Affect: Mood normal.         Data:     Lab Results   Component Value Date     02/27/2022    K 4.1 02/27/2022    CL 98 02/27/2022    CO2 22 02/27/2022    BUN 16 02/27/2022    CREATININE 0.77 02/27/2022    GLUCOSE 322 02/27/2022    PROT 7.9 02/27/2022    LABALBU 4.6 02/27/2022    BILITOT 0.41 02/27/2022    ALKPHOS 72 02/27/2022    AST 21 02/27/2022    ALT 24 02/27/2022     Lab Results   Component Value Date    WBC 7.3 02/27/2022    RBC 4.71 02/27/2022    HGB 15.2 02/27/2022    HCT 42.8 02/27/2022    MCV 90.9 02/27/2022    MCH 32.3 02/27/2022    MCHC 35.5 02/27/2022    RDW 11.9 02/27/2022     02/27/2022    MPV 10.4 02/27/2022     No results found for: TSH  Lab Results   Component Value Date    LABA1C 10.2 02/27/2022       Assessment/Plan:      Diagnosis Orders   1. Encounter to establish care     2.  Type 2 diabetes mellitus without complication, without long-term current use of insulin ([address]  [facility] Diabetes Education [place]    metFORMIN (GLUCOPHAGE) 500 MG tablet    Lipid Panel    glucose monitoring (FREESTYLE FREEDOM) kit    Accu-Chek Multiclix Lancets MISC    POCT glycosylated hemoglobin (Hb A1C)   3. Lipid screening  Lipid Panel     Type 2 Diabetes without insulin use-Referral to [facility] Diabetic Education. Glucose monitor and supplies ordered. Pt. Instructed to take his blood sugars first thing in the morning and 2 hours after largest meal and log provided. Pt. To start Metformin 500 mg BID. Discussed diet and exercise at length with patient today. Encouraged to get an appointment with Optometrist.  Will continue to closely monitor. Lipid Screening-Labs ordered and will call with results. 1.  Dimple Chapman received counseling on the following healthy behaviors: nutrition, exercise and medication adherence  2. Patient given educational materials - see patient instructions  3. Was a self-tracking handout given in paper form or via [employer]? Yes  If yes, see orders or list here. 4.  Discussed use, benefit, and side effects of prescribed medications. Barriers to medication compliance addressed. All patient questions answered. Pt voiced understanding. 5.  Reviewed prior labs and health maintenance  6. Continue current medications, diet and exercise. Completed Refills   Requested Prescriptions     Signed Prescriptions Disp Refills    metFORMIN (GLUCOPHAGE) 500 MG tablet 30 tablet 1     Sig: Take 1 tablet by mouth 2 times daily (with meals)    glucose monitoring (FREESTYLE FREEDOM) kit 1 kit 0     Si kit by Does not apply route daily    Accu-Chek Multiclix Lancets MISC 180 each 3     Si dose pack by Does not apply route 2 times daily         Return in about 3 months (around 2022).

## 2022-04-12 RX ORDER — GLUCOSAMINE HCL/CHONDROITIN SU 500-400 MG
CAPSULE ORAL
Qty: 100 STRIP | Refills: 1 | Status: SHIPPED | OUTPATIENT
Start: 2022-04-12

## 2022-04-19 ENCOUNTER — HOSPITAL ENCOUNTER (OUTPATIENT)
Dept: DIABETES SERVICES | Age: 47
Setting detail: THERAPIES SERIES
Discharge: HOME | End: 2022-04-19
Payer: COMMERCIAL

## 2022-04-19 PROCEDURE — G0108 DIAB MANAGE TRN  PER INDIV: HCPCS

## 2022-04-19 SDOH — ECONOMIC STABILITY: FOOD INSECURITY: ADDITIONAL INFORMATION: NO

## 2022-04-19 NOTE — PROGRESS NOTES
Diabetes Self-Management Education Record    Progress Note:  Patient arrived to appointment by self for initial assessment for diabetes education for a new diagnosis of Type 2 diabetes. He was in the ER in Feb and had a glucose reading >300 and A1C >10. He presented at that time with blurred vision increased thirst, and frequent urination. He had repeat A1C on 04/11/22 and it was 11.1. He has been started on Metformin 500 mg bid and is currently taking this daily and plans to add additional dose in the next couple of days. He has talked with other people and \"is concerned about potential side effects\" and wanted to start it slowly. Discuss potential side effects and denies any at this time. Encourage and instruct to add second dose. He has not started monitoring as he has some difficulty with lancing device. He had been drinking [employer] 2-20 ounce bottles a day and a gallon of milk a day. He has stopped drinking carb containing beverages. He admits to only having one meal a day (typically supper) and then snacking throughout the evening and maybe into the night. The no exercise other than the physical activity he gets at work. Eye exam is not current. He is instructed on glucometer use. He is able to perform a glucose test in the office without difficulty and his result is 118 at 4:30 pm which is fasting as he has not had any food at this point. Ordered to check glucose fasting and 2 hours after his largest meal.  Reinforce testing times and discuss target ranges of fasting  and 2 hour post prandial <180. Logbook is brought and instructed to record results and take to PCP appointments and visits with educator. Safe needle handout given and reviewed. Blurry vision has improved and discuss the need to get eye exam scheduled asap. Discuss the need to add more meals throughout the day. Shown handout of Diabetes Nutrition Placemat. Emphasize portion control and measuring foods.   My Carbohydrate Guide is given and very briefly reviewed. Educated on reading the food label and evaluating total carb versus sugars. Praised for his willingness to eliminate [facility] and decrease milk usage. Encourage no more than 3 glasses each day. He plans to focus on eating more regularly first.  He did recently change to a different department at work that involves more walking. Discuss that increased steps and increased activity at work are good however, it does not take the place of exercise and setting time aside to get heart rate elevated. He plans to start slowly and gradually increase. Plans for 15 minutes 2 times a day. He does admit to having 4-5 beers a day. Discuss decreasing the amount which he is already doing and that if he is going to have alcohol it should be included with a meal or snack. All of his questions are answered. He would like to have a few weeks to make adjustments to lifestyle and start monitoring glucose readings. He agrees to come back in 2 weeks with educator. He will consider seeing dietitian as well. Office number is given to call with questions or concerns. He seems very motivated and committed to making changes to his daily lifestyle.     Participant Name: Taye Elliott   Referring Provider:  EMILY Tineo CNP    Keys to learning:  Considerations: []Language []Emotional []Health Literacy  []Cognitive []Memory changes []Financial []Cultural   []Jain []Vision []Hearing  []Speech []Lack of desire  []Literacy  []Psycho-social  [x]None [x] Covid-19 group restrictions  If considerations are noted, accommodations made:     Identified barriers to learning/self management: works full time    The following information was discussed:    [] Diabetes disease process and treatment options   [x] Healthy nutrition, carbohydrate counting, meal planning  [x] Monitoring blood glucose and other parameters; interpreting and using results  [] Acute complications--prevention, detection and treatment  [] Medication management and safety   [x] Incorporating physical activity into lifestyle   [] Exercise for Health, Reducing Risks for Heart Disease, Diabetes and Heart Health  [] Preventing, through risk reduction behaviors, detecting, and treating chronic complications  [] Sick Day management  [x] Developing personalized strategies to address psychosocial issues and concerns  [x] Developing personalized strategies to promote health and behavior change through goalsetting, behavior change strategies aimed at risk reduction  [] Special situations--disaster planning, travel, social activities  [] Foot, skin, and dental care    Session Assessment & Evaluation Ratings:  1=Needs Instruction  2=Needs Review  3=Comprehends Key Points  4=Demonstrates Understanding/Competency  NC=Not Covered   N/A=Not Applicable Initial  Assess    Date:  04/19/22 2nd  Visit     Date:   3rd  Visit    Date: 4th  Visit    Date: Comments  S.O.C=Stage of Change/Readiness to change:  Pre=Pre-contemplation stage--not thinking about changing  C=Contemplation stage--ambivalent about changing  P=Preparation stage--prepared to made a specific change  A=Action stage--committed to modify behaviors  M=Maintenance and relapse prevention--incorporating new behavior   Participant Stated  Goal      I will eat more consistent-I will add breakfast every day and not go more than 5 hours without eating. Participant Stated Goal  I will start exercising every morning for 15 minutes and do 15 minutes in the evening.      S.O.C  [] PRE  [x] C  [] P      [] A  [] M                    S.O.C  [] PRE  [x] C  [] P      [] A  [] M     S.O.C  [] PRE  [] C  [] P      [] A  [] M                     S.O.C  [] PRE  [] C  [] P      [] A  [] M      S.O.C  [] PRE  [] C  [] P      [] A  [] M                    S.O.C  [] PRE  [] C  [] P      [] A  [] M     S.O.C  [] PRE  [] C  [] P      [] A  [] M                    S.O.C  [] PRE  [] C  [] P      [] A  [] M   Current main goal attainment frequency:   [x] Never  [] Some  [] Half  [] Most  [] All    Participant confidence to master goal this visit:   [] Excellent  [x] Good  [] West[place] Pac  [] Poor            Current main goal attainment frequency:   [x] Never  [] Some  [] Half  [] Most  [] All    Participant confidence to master goal this visit:   [] Excellent  [x] Good [] Fair  [] Poor                  Session  Topics & Learning Objectives:      Comments:   Diabetes disease process & Treatment process: Define diabetes & prediabetes; identify own type of diabetes; role of the pancreas; signs/symptoms; diagnostic criteria; prevention and treatment options; contributing factors. Rating  [x] 1  [] 2  [] 3  [] 4      [] NC  [] N/A   Rating  [] 1  [] 2  [] 3  [] 4  [] NC  [] N/A     Rating  [] 1  [] 2  [] 3  [] 4  [] NC  [] N/A     Rating  [] 1  [] 2  [] 3  [] 4  [] NC  [] N/A           Incorporating nutritional management into lifestyle: Describe effect of type, amount & timing of food on blood glucose; Describe basic meal planning techniques & current nutrition guidelines; Correctly read food labels & demonstrate CHO counting & portion control with personalized meal plan. Rating  [x] 1  [] 2  [] 3  [] 4  [] NC  [] N/A     Rating  [] 1  [] 2  [] 3  [] 4  [] NC  [] N/A     Rating  [] 1  [] 2  [] 3  [] 4  [] NC  [] N/A     Rating  [] 1  [] 2  [] 3  [] 4  [] NC  [] N/A                 Incorporating physical activity into lifestyle:   State effect of exercise on blood glucose levels. Identifies personal exercise plan and contraindications. Discussed safety tips while exercising.             Rating  [x] 1  [] 2  [] 3  [] 4  [] NC  [] N/A     Rating  [] 1  [] 2  [] 3  [] 4  [] NC  [] N/A       Rating  [] 1  [] 2  [] 3  [] 4  [] NC  [] N/A     Rating  [] 1  [] 2  [] 3  [] 4  [] NC  [] N/A           Using medications safely: State effect of diabetes medicines on diabetes;   Name diabetes medication taking, action, timing & side effects. Rating  [x] 1  [] 2  [] 3  [] 4  [] NC  [] N/A       Rating  [] 1  [] 2  [] 3  [] 4  [] NC  [] N/A         Rating  [] 1  [] 2  [] 3  [] 4  [] NC  [] N/A   Rating  [] 1  [] 2  [] 3  [] 4  [] NC  [] N/A      ________             Insulin/injectable-  Appropriate injection site; proper storage; proper technique; safe needle disposal.   Rating  [] 1  [] 2  [] 3  [] 4  [] NC  [x] N/A Rating  [] 1  [] 2  [] 3  [] 4  [] NC  [] N/A Rating  [] 1  [] 2  [] 3  [] 4  [] NC  [] N/A Rating  [] 1  [] 2  [] 3  [] 4  [] NC  [] N/A        Monitoring blood glucose, interpreting and using results: Identify recommended blood glucose targets, personal targets, A1C target, importance of logging glucose,appropriate techniques and problem solving. Safe lancet disposal.    Rating  [x] 1  [] 2  [] 3  [] 4  [] NC  [] N/A     Rating  [] 1  [] 2  [] 3  [] 4  [] NC  [] N/A       Rating  [] 1  [] 2  [] 3  [] 4  [] NC  [] N/A     Rating  [] 1  [] 2  [] 3  [] 4  [] NC  [] N/A         Prevention, detection & treatment of acute complications: List symptoms of hyper- and hypoglycemia; Describe how to treat low blood sugar & actions for lowering high blood glucose levels. Prevention and treatment strategies. Rating  [x] 1  [] 2  [] 3  [] 4  [] NC  [] N/A   Rating  [] 1  [] 2  [] 3  [] 4  [] NC  [] N/A   Rating  [] 1  [] 2  [] 3  [] 4  [] NC  [] N/A Rating  [] 1  [] 2  [] 3  [] 4  [] NC  [] N/A                   Describe sick day guidelines and indications for physician notification.    Rating  [x] 1  [] 2  [] 3  [] 4  [] NC  [] N/A     Rating  [] 1  [] 2  [] 3  [] 4  [] NC  [] N/A     Rating  [] 1  [] 2  [] 3  [] 4  [] NC  [] N/A     Rating  [] 1  [] 2  [] 3  [] 4  [] NC  [] N/A        Prevention, detection & treatment of chronic complications: Define the natural course of diabetes & describe the relationship of blood glucose levels to long term complications of diabetes. Identify preventative measures and standard of care. Rating  [x] 1  [] 2  [] 3  [] 4  [] NC  [] N/A     Rating  [] 1  [] 2  [] 3  [] 4  [] NC  [] N/A     Rating  [] 1  [] 2  [] 3  [] 4  [] NC  [] N/A     Rating  [] 1  [] 2  [] 3  [] 4  [] NC  [] N/A      Developing strategies to address psychosocial issues: Describe feelings about living with diabetes; Identify support needed & support network. Describe how stress, depression, and anxiety affect blood glucose. Identify coping strategies. Rating  [x] 1  [] 2  [] 3  [] 4  [] NC  [] N/A       Rating  [] 1  [] 2  [] 3  [] 4  [] NC  [] N/A     Rating  [] 1  [] 2  [] 3  [] 4  [] NC  [] N/A     Rating  [] 1  [] 2  [] 3  [] 4  [] NC  [] N/A          Developing strategies to promote health/change behavior:  Define the ABCs of diabetes; Identify appropriate screenings, schedule & personal plan for screenings. Identify 7 self-care behaviors. Benefits, challenges and strategies for behavioral change. Rating  [x] 1  [] 2  [] 3  [] 4  [] NC  [] N/A     Rating  [] 1  [] 2  [] 3  [] 4  [] NC  [] N/A     Rating  [] 1  [] 2  [] 3  [] 4  [] NC  [] N/A     Rating  [] 1  [] 2  [] 3  [] 4  [] NC  [] N/A             Time spent with patient: [id number]    Next Appointment: 2 weeks     Instruction Method: [x]Lecture/Discussion  []Power Point Presentation  [x]Handouts  []Return Demonstration     Education Materials/Equipment Provided:      [x]Self-Management - Initial assessment - ADA  Where do I Begin? Living with Type 2 diabetes\" booklet;  Diet meal planning basics, handout on diabetes education classes, hyper/hypoglycemia signs/symptoms and treatment handout; Diabetes zones; Support plan resource list.      []Self-Management  Class 1 - Diabetes: Your Take Control Guide\" booklet. Healthy Interactions [facility] \"On The Road To Better Managing Your Diabetes\".      [] Self-Management  Class 2 -  \"Traveling with Diabetes\", Dining Out With Diabetes, \"Coping with Diabetes\", \"Diabetes Disaster Planning\", \"Know Your Numbers/Diabetes Care Checklist\", [address] [place] [address] Blood Sugar, Low Blood Sugar. Healthy Interactions Map \"Monitoring Your Blood Glucose. \"     [] Self-Management  Class 3 - \"Risk Factors for CVD\", foot care tips sheet and \"How to pick the right shoe\", \"Medications Used To Treat Diabetes\", How To Care For Your Teeth and Gums\", \"Vaccinations For Adults with Diabetes\", \"Type 2 diabetes and the role of GLP-1\". Healthy Interactions Map \"Continuing Your Journey\". []Self-Management - 3 month follow-up      []Glucose Meter      []Insulin Kit      []Other        Handouts/Booklets given:     [] How To Thrive: A Guide for Your Journey with Diabetes    [] Daily Diabetic Meal Planning Guide   [] Nutrition in the [place] [address]    [] Resources for People With Diabetes   [] Other       Diabetes Self Management Support Plan: To assist and support your continued progress in managing your diabetes following education-    (  X)  Gym or exercise program of your choice. (Suggestions:  [employer], Circuit training, any exercise facility and your local Physical Therapy or Cardiac Rehabilitation exercise Program)      (  )   Library    (  )    ADA website:  [employer].ca. org    (  )   Http: DotProtection.gl    (  )   Diabetes Forecast [place] you may get this information on the ADA web site.     (  )  Diabetes Interview - [place]   [phone]    (  )  Diabetes Self Management (bi-monthly magazine)  1-560.217.4235    (  ) Support group: (  ) Support group: Kailyn first Tuesday of the month at 9 am and Osiel Arrieta third Wednesday of the month at 9 am    (  )  Health Journeys [employer]  (relaxation tapes for people with Diabetes)  [phone]    (  )  Your suggestions:                     Willy Sicard RN, BSN, Jann Leon

## 2022-09-10 ENCOUNTER — HOSPITAL ENCOUNTER (EMERGENCY)
Age: 47
Discharge: HOME | End: 2022-09-10
Payer: OTHER GOVERNMENT

## 2022-09-10 ENCOUNTER — APPOINTMENT (OUTPATIENT)
Dept: CT IMAGING | Age: 47
End: 2022-09-10
Payer: OTHER GOVERNMENT

## 2022-09-10 VITALS
BODY MASS INDEX: 32.08 KG/M2 | WEIGHT: 230 LBS | TEMPERATURE: 98.1 F | SYSTOLIC BLOOD PRESSURE: 130 MMHG | HEART RATE: 80 BPM | DIASTOLIC BLOOD PRESSURE: 98 MMHG | RESPIRATION RATE: 18 BRPM | OXYGEN SATURATION: 97 %

## 2022-09-10 DIAGNOSIS — K46.9 ABDOMINAL HERNIA WITHOUT OBSTRUCTION AND WITHOUT GANGRENE, RECURRENCE NOT SPECIFIED, UNSPECIFIED HERNIA TYPE: Primary | ICD-10-CM

## 2022-09-10 LAB
ABSOLUTE EOS #: 0.13 K/UL (ref 0–0.44)
ABSOLUTE IMMATURE GRANULOCYTE: 0.1 K/UL (ref 0–0.3)
ABSOLUTE LYMPH #: 2.21 K/UL (ref 1.1–3.7)
ABSOLUTE MONO #: 0.78 K/UL (ref 0.1–1.2)
ALBUMIN SERPL-MCNC: 4.8 G/DL (ref 3.5–5.2)
ALBUMIN/GLOBULIN RATIO: 1.8 (ref 1–2.5)
ALP BLD-CCNC: 62 U/L (ref 40–129)
ALT SERPL-CCNC: 30 U/L (ref 5–41)
ANION GAP SERPL CALCULATED.3IONS-SCNC: 12 MMOL/L (ref 9–17)
AST SERPL-CCNC: 18 U/L
BASOPHILS # BLD: 0 % (ref 0–2)
BASOPHILS ABSOLUTE: 0.03 K/UL (ref 0–0.2)
BILIRUB SERPL-MCNC: 0.4 MG/DL (ref 0.3–1.2)
BUN BLDV-MCNC: 17 MG/DL (ref 6–20)
BUN/CREAT BLD: 21 (ref 9–20)
CALCIUM SERPL-MCNC: 9.7 MG/DL (ref 8.6–10.4)
CHLORIDE BLD-SCNC: 99 MMOL/L (ref 98–107)
CO2: 26 MMOL/L (ref 20–31)
CREAT SERPL-MCNC: 0.81 MG/DL (ref 0.7–1.2)
EOSINOPHILS RELATIVE PERCENT: 2 % (ref 1–4)
GFR AFRICAN AMERICAN: >60 ML/MIN
GFR NON-AFRICAN AMERICAN: >60 ML/MIN
GFR SERPL CREATININE-BSD FRML MDRD: ABNORMAL ML/MIN/{1.73_M2}
GFR SERPL CREATININE-BSD FRML MDRD: ABNORMAL ML/MIN/{1.73_M2}
GLUCOSE BLD-MCNC: 152 MG/DL (ref 70–99)
HCT VFR BLD CALC: 40.6 % (ref 40.7–50.3)
HEMOGLOBIN: 13.9 G/DL (ref 13–17)
IMMATURE GRANULOCYTES: 1 %
LACTIC ACID: 1.6 MMOL/L (ref 0.5–2.2)
LYMPHOCYTES # BLD: 28 % (ref 24–43)
MCH RBC QN AUTO: 31.8 PG (ref 25.2–33.5)
MCHC RBC AUTO-ENTMCNC: 34.2 G/DL (ref 28.4–34.8)
MCV RBC AUTO: 92.9 FL (ref 82.6–102.9)
MONOCYTES # BLD: 10 % (ref 3–12)
NRBC AUTOMATED: 0 PER 100 WBC
PDW BLD-RTO: 11.9 % (ref 11.8–14.4)
PLATELET # BLD: 197 K/UL (ref 138–453)
PMV BLD AUTO: 10.1 FL (ref 8.1–13.5)
POTASSIUM SERPL-SCNC: 4.1 MMOL/L (ref 3.7–5.3)
RBC # BLD: 4.37 M/UL (ref 4.21–5.77)
SEG NEUTROPHILS: 59 % (ref 36–65)
SEGMENTED NEUTROPHILS ABSOLUTE COUNT: 4.6 K/UL (ref 1.5–8.1)
SODIUM BLD-SCNC: 137 MMOL/L (ref 135–144)
TOTAL PROTEIN: 7.5 G/DL (ref 6.4–8.3)
WBC # BLD: 7.9 K/UL (ref 3.5–11.3)

## 2022-09-10 PROCEDURE — 74177 CT ABD & PELVIS W/CONTRAST: CPT

## 2022-09-10 PROCEDURE — 99285 EMERGENCY DEPT VISIT HI MDM: CPT

## 2022-09-10 PROCEDURE — 80053 COMPREHEN METABOLIC PANEL: CPT

## 2022-09-10 PROCEDURE — 36415 COLL VENOUS BLD VENIPUNCTURE: CPT

## 2022-09-10 PROCEDURE — 85025 COMPLETE CBC W/AUTO DIFF WBC: CPT

## 2022-09-10 PROCEDURE — 6360000004 HC RX CONTRAST MEDICATION: Performed by: NURSE PRACTITIONER

## 2022-09-10 PROCEDURE — 83605 ASSAY OF LACTIC ACID: CPT

## 2022-09-10 RX ADMIN — IOPAMIDOL 75 ML: 755 INJECTION, SOLUTION INTRAVENOUS at 12:08

## 2022-09-10 ASSESSMENT — PAIN DESCRIPTION - LOCATION: LOCATION: ABDOMEN

## 2022-09-10 ASSESSMENT — PAIN - FUNCTIONAL ASSESSMENT: PAIN_FUNCTIONAL_ASSESSMENT: 0-10

## 2022-09-10 ASSESSMENT — ENCOUNTER SYMPTOMS
RESPIRATORY NEGATIVE: 1
EYES NEGATIVE: 1
ABDOMINAL PAIN: 1

## 2022-09-10 ASSESSMENT — PAIN DESCRIPTION - PAIN TYPE: TYPE: ACUTE PAIN

## 2022-09-10 ASSESSMENT — PAIN DESCRIPTION - DESCRIPTORS: DESCRIPTORS: DISCOMFORT

## 2022-09-10 ASSESSMENT — PAIN DESCRIPTION - ORIENTATION: ORIENTATION: MID

## 2022-09-10 ASSESSMENT — PAIN SCALES - GENERAL: PAINLEVEL_OUTOF10: 7

## 2022-09-10 ASSESSMENT — PAIN DESCRIPTION - FREQUENCY: FREQUENCY: INTERMITTENT

## 2022-09-10 NOTE — DISCHARGE INSTRUCTIONS
Follow up with your PCP for referral to general surgery for evaluation of your hernia. In the meantime do not lift anything heavier than 5lbs.

## 2022-09-10 NOTE — ED PROVIDER NOTES
[facility] [place] ED  EMERGENCY DEPARTMENT ENCOUNTER      Pt Name: Anneliese Larose  MRN: [id number]  [place] 1975  Date of evaluation: 9/10/2022  Provider: EMILY Garrett CNP    CHIEF COMPLAINT       Chief Complaint   Patient presents with    Abdominal Pain     Onset this AM. Pt reports history of hernia that he is usually able to reduce at home, but not today           HISTORY OF PRESENT ILLNESS   (Location/Symptom, Timing/Onset, Context/Setting, Quality, Duration, Modifying Factors, Severity)  Note limiting factors. Anneliese Larose is a 42 y.o. male who presents to the emergency department with abdominal pain which he thinks is his abdominal hernia. He states he has had it for years but is usually able to reduce it but just pushing it back in but today he could not and is feeling a burning sensation. PMHx DMII. Nursing Notes were reviewed. REVIEW OF SYSTEMS    (2-9 systems for level 4, 10 or more for level 5)     Review of Systems   Constitutional: Negative. HENT: Negative. Eyes: Negative. Respiratory: Negative. Cardiovascular: Negative. Gastrointestinal:  Positive for abdominal pain. Genitourinary: Negative. Musculoskeletal: Negative. Skin: Negative. Neurological: Negative. Psychiatric/Behavioral: Negative. Except as noted above the remainder of the review of systems was reviewed and negative. PAST MEDICAL HISTORY     Past Medical History:   Diagnosis Date    Anxiety     Depression     Hernia, abdominal     Hyperlipidemia          SURGICAL HISTORY       Past Surgical History:   Procedure Laterality Date    HERNIA REPAIR Right     inguinal    LYMPH NODE BIOPSY           CURRENT MEDICATIONS       Previous Medications    ACCU-CHEK MULTICLIX LANCETS MISC    1 dose pack by Does not apply route 2 times daily    BLOOD GLUCOSE MONITOR STRIPS    Test 2 times a day & as needed for symptoms of irregular blood glucose.  Dispense sufficient amount for indicated testing frequency plus additional to accommodate PRN testing needs. GLUCOSE MONITORING (FREESTYLE FREEDOM) KIT    1 kit by Does not apply route daily    METFORMIN (GLUCOPHAGE) 500 MG TABLET    Take 1 tablet by mouth 2 times daily (with meals)       ALLERGIES     Bee venom    FAMILY HISTORY       Family History   Problem Relation Age of Onset    Other Mother         thyroid           SOCIAL HISTORY       Social History     Socioeconomic History    Marital status:      Spouse name: None    Number of children: None    Years of education: None    Highest education level: None   Tobacco Use    Smoking status: Former     Packs/day: 0.50     Types: Cigarettes    Smokeless tobacco: Current     Types: Chew   Vaping Use    Vaping Use: Never used   Substance and Sexual Activity    Alcohol use: Yes     Alcohol/week: 6.0 standard drinks     Types: 6 Cans of beer per week     Comment: 6 beers daily    Drug use: No     Social Determinants of Health     Financial Resource Strain: Low Risk     Difficulty of Paying Living Expenses: Not hard at all   Food Insecurity: No Food Insecurity    Worried About [facility] [employer] in the Last Year: Never true    Ran Out of Food in the Last Year: Never true       SCREENINGS         John Coma Scale  Eye Opening: Spontaneous  Best Verbal Response: Oriented  Best Motor Response: Obeys commands  John Coma Scale Score: 15                     CIWA Assessment  BP: (!) 130/98  Heart Rate: 80                 PHYSICAL EXAM    (up to 7 for level 4, 8 or more for level 5)     ED Triage Vitals [09/10/22 1054]   BP Temp Temp src Heart Rate Resp SpO2 Height Weight   (!) 130/98 98.1 °F (36.7 °C) -- 80 18 97 % -- 230 lb (104.3 kg)       Physical Exam  Constitutional:       General: He is not in acute distress. Appearance: Normal appearance. He is not toxic-appearing. Cardiovascular:      Rate and Rhythm: Normal rate and regular rhythm.    Pulmonary:      Effort: Pulmonary effort is normal. No respiratory distress. Abdominal:      General: There is no distension. Comments: +ttp mid right abd pain just lateral to umbilicus with no obvious mass or hernia detected   Musculoskeletal:         General: No swelling or deformity. Skin:     General: Skin is warm and dry. Findings: No lesion or rash. Neurological:      General: No focal deficit present. Mental Status: He is alert and oriented to person, place, and time. Psychiatric:         Mood and Affect: Mood normal.         Behavior: Behavior normal.       DIAGNOSTIC RESULTS     EKG: All EKG's are interpreted by the Emergency Department Physician who either signs or Co-signs this chart in the absence of a cardiologist.        RADIOLOGY:   Non-plain film images such as CT, Ultrasound and MRI are read by the radiologist. Plain radiographic images are visualized and preliminarily interpreted by the emergency physician with the below findings:        Interpretation per the Radiologist below, if available at the time of this note:    CT ABDOMEN PELVIS W IV CONTRAST Additional Contrast? None   Final Result   No acute abdominal or pelvic abnormality. Hepatic steatosis. Small midline fat containing hernia superior to the umbilicus. Small fat   containing umbilical hernia. ED BEDSIDE ULTRASOUND:   Performed by ED Physician - none    LABS:  Labs Reviewed   CBC WITH AUTO DIFFERENTIAL - Abnormal; Notable for the following components:       Result Value    Hematocrit 40.6 (*)     Immature Granulocytes 1 (*)     All other components within normal limits   COMPREHENSIVE METABOLIC PANEL - Abnormal; Notable for the following components:    Glucose 152 (*)     Bun/Cre Ratio 21 (*)     All other components within normal limits   LACTIC ACID       All other labs were within normal range or not returned as of this dictation.     EMERGENCY DEPARTMENT COURSE and DIFFERENTIAL DIAGNOSIS/MDM:   Vitals:    Vitals:    09/10/22 1054   BP: (!) 130/98   Pulse: 80   Resp: 18   Temp: 98.1 °F (36.7 °C)   SpO2: 97%   Weight: 230 lb (104.3 kg)           MDM  Number of Diagnoses or Management Options  Abdominal hernia without obstruction and without gangrene, recurrence not specified, unspecified hernia type  Diagnosis management comments: Labs including lactic acid and CT abd ordered as I am unable to palpate his hernia on exam either lying flat or standing up.      --CT displayed small fat-containing abdominal hernia with no signs of Strangulation. Lactic acid was negative and all other labs were unremarkable. Patient will be discharged home and he was instructed to follow-up with his PCP for possible general surgery referral.              FINAL IMPRESSION      1. Abdominal hernia without obstruction and without gangrene, recurrence not specified, unspecified hernia type          DISPOSITION/PLAN   DISPOSITION Decision To Discharge 09/10/2022 12:56:25 PM      PATIENT REFERRED TO:  EMILY Maurer CNP  [address] [address]  [facility], APRN - CNP  [address]  [phone]    Schedule an appointment as soon as possible for a visit in 1 week  Follow up within 1 week, Return to ED sooner if symptoms worsen,     DISCHARGE MEDICATIONS:  New Prescriptions    No medications on file     Controlled Substances Monitoring:     No flowsheet data found.     (Please note that portions of this note were completed with a voice recognition program.  Efforts were made to edit the dictations but occasionally words are mis-transcribed.)    EMILY Neal CNP (electronically signed)  Attending Emergency Physician           EMILY Neal CNP  09/10/22 [id number]

## 2022-09-10 NOTE — LETTER
[facility] ED  [address] [place] [address]  Phone: [phone]  Fax: [phone]             September 10, 2022    Patient: Evette Sosa   YOB: 1975   Date of Visit: 9/10/2022       To Whom It May Concern:    Franco Black was seen and treated in our emergency department on 9/10/2022. He may return to work on 09/11/2022. Do not lift anything over 5 lb until cleared by physician.       Sincerely,             Signature:__________________________________

## 2022-09-12 ENCOUNTER — TELEPHONE (OUTPATIENT)
Dept: PRIMARY CARE CLINIC | Age: 47
End: 2022-09-12

## 2022-09-12 DIAGNOSIS — K46.9 NON-RECURRENT ABDOMINAL HERNIA WITHOUT OBSTRUCTION OR GANGRENE, UNSPECIFIED HERNIA TYPE: Primary | ICD-10-CM

## 2022-09-12 NOTE — TELEPHONE ENCOUNTER
[facility] Trans[facility] Initial Follow Up Call    Outreach made within 2 business days of discharge: Yes    Patient: Huong Fabian Patient : 1975   MRN: [id number]  Reason for Admission: There are no discharge diagnoses documented for the most recent discharge. Discharge Date: 9/10/22       Spoke with: patient called nack @ 11:45am talked to [facility]     Discharge department/facility: [facility]    TCM Interactive Patient Contact:  Was patient able to fill all prescriptions:   Was patient instructed to bring all medications to the follow-up visit:   Is patient taking all medications as directed in the discharge summary? Does patient understand their discharge instructions:   Does patient have questions or concerns that need addressed prior to 7-14 day follow up office visit:     Scheduled appointment with PCP within 7-14 days    Follow Up  No future appointments.     Nanette Nicole MA

## 2022-09-12 NOTE — TELEPHONE ENCOUNTER
Patient was seen in the ED for stomach pain. He had a CT done and said they told him he has a hernia and needed referred to surgery. Do you need to see him first or can you refer off the CT results?   Please advise thank you

## 2022-10-12 ENCOUNTER — OFFICE VISIT (OUTPATIENT)
Dept: SURGERY | Age: 47
End: 2022-10-12
Payer: COMMERCIAL

## 2022-10-12 DIAGNOSIS — K43.6 INCARCERATED VENTRAL HERNIA: Primary | ICD-10-CM

## 2022-10-12 PROCEDURE — G8427 DOCREV CUR MEDS BY ELIG CLIN: HCPCS | Performed by: SURGERY

## 2022-10-12 PROCEDURE — G8417 CALC BMI ABV UP PARAM F/U: HCPCS | Performed by: SURGERY

## 2022-10-12 PROCEDURE — G8484 FLU IMMUNIZE NO ADMIN: HCPCS | Performed by: SURGERY

## 2022-10-12 PROCEDURE — 99203 OFFICE O/P NEW LOW 30 MIN: CPT | Performed by: SURGERY

## 2022-10-12 PROCEDURE — 4004F PT TOBACCO SCREEN RCVD TLK: CPT | Performed by: SURGERY

## 2022-10-12 NOTE — PROGRESS NOTES
GENERAL SURGERY CONSULTATION      Patient's Name/ Date of Birth/ Gender: Gurpreet Day / 1975 (41 y.o.) / male     PCP: EMILY Alexis CNP  Referring:     History of present Illness:  Patient is a pleasant 41 y.o. male  kindly referred by EMILY Alexis CNP    Went to ER for severe abdominal pain, had hernia reduced, works at [employer], does lifting, no prior abdominal surgeries before. Hernia was incarcerated having pain sever enough to have to go to the ER. High risk for re-incarceration after being reduced. Past Medical History:  has a past medical history of Anxiety, Depression, Hernia, abdominal, and Hyperlipidemia. Past Surgical History:   Past Surgical History:   Procedure Laterality Date    HERNIA REPAIR Right     inguinal    LYMPH NODE BIOPSY         Social History:  reports that he has quit smoking. His smoking use included cigarettes. He smoked an average of .5 packs per day. His smokeless tobacco use includes chew. He reports current alcohol use of about 42.0 standard drinks per week. He reports that he does not use drugs. Family History: family history includes Other in his mother.     Review of Systems:   General: Completed and, except as mentioned above, was negative or noncontributory  Psychological:  Completed and, except as mentioned above, was negative or noncontributory  Ophthalmic:  Completed and, except as mentioned above, was negative or noncontributory  ENT:  Completed and, except as mentioned above, was negative or noncontributory  Allergy and Immunology:  Completed and, except as mentioned above, was negative or noncontributory  Hematological and Lymphatic:  Completed and, except as mentioned above, was negative or noncontributory  Endocrine: Completed and, except as mentioned above, was negative or noncontributory  Breast:  Completed and, except as mentioned above, was negative or noncontributory  Respiratory:  Completed and, except as mentioned above, was negative or noncontributory  Cardiovascular:  Completed and, except as mentioned above, was negative or noncontributory  Gastrointestinal: Completed and, except as mentioned above, was negative or noncontributory  Genito-Urinary:  Completed and, except as mentioned above, was negative or noncontributory  Musculoskeletal:  Completed and, except as mentioned above, was negative or noncontributory  Neurological:  Completed and, except as mentioned above, was negative or noncontributory  Dermatological:  Completed and, except as mentioned above, was negative or noncontributory    Allergies: Bee venom    Current Meds:  Current Facility-Administered Medications:     ceFAZolin (ANCEF) 2000 mg in dextrose 5 % 100 mL IVPB, 2,000 mg, IntraVENous, Once, Chelo Delacruz, DO    lactated ringers infusion, , IntraVENous, Continuous, Samella Call, APRN - CRNA, Last Rate: 100 mL/hr at 10/13/22 1602, NoRateChange at 10/13/22 1602    Facility-Administered Medications Ordered in Other Encounters:     ropivacaine (NAROPIN) 0.5% injection, , Spinal/Regional, PRN, Samella Call, APRN - CRNA, 60 mL at 10/13/22 1551    dexamethasone (PF) (DECADRON) injection, , Other, PRN, Samella Call, APRN - CRNA, 10 mg at 10/13/22 1551    dexamethasone (DECADRON) injection, , Other, PRN, Samella Call, APRN - CRNA, 2 mg at 10/13/22 1551    sodium chloride (PF) 0.9 % injection, , Other, PRN, Samella Call, APRN - CRNA, 30 mL at 10/13/22 1551    midazolam (VERSED) injection, , IntraVENous, PRN, Samella Call, APRN - CRNA, 2 mg at 10/13/22 1544    Physical Exam:  Vital signs and Nurse's note reviewed. /82   Pulse 83   Temp 97.8 °F (36.6 °C) (Temporal)   Resp 16   Ht 5' 11.5\" (1.816 m)   Wt 230 lb (104.3 kg)   SpO2 96%   BMI 31.63 kg/m²    height is 5' 11.5\" (1.816 m) and weight is 230 lb (104.3 kg). His temporal temperature is 97.8 °F (36.6 °C). His blood pressure is 130/82 and his pulse is 83.  His respiration is 16 and oxygen saturation is 96%. Gen:  A&Ox3, NAD. Pleasant and cooperative. HEENT: PERRLA, EOMI, no scleral icterus  Neck:  no goiter  CVS: Regular rate and rhythm  Resp: Good bilateral air entry, no active wheezing, no labored breathing  Abd: soft, non-tender, non-distended, bowel sounds present, incarcerated umbilical and ventral hernia, reduced, high risk for re-incarceration  Ext: Moves all extremities, no gross focal motor deficits  Skin: No erythema or ulcerations     Labs:   Lab Results   Component Value Date/Time    WBC 7.9 09/10/2022 11:45 AM    HGB 13.9 09/10/2022 11:45 AM    HCT 40.6 09/10/2022 11:45 AM    MCV 92.9 09/10/2022 11:45 AM     09/10/2022 11:45 AM     Lab Results   Component Value Date/Time     09/10/2022 11:45 AM    K 4.1 09/10/2022 11:45 AM    CL 99 09/10/2022 11:45 AM    CO2 26 09/10/2022 11:45 AM    BUN 17 09/10/2022 11:45 AM    CREATININE 0.81 09/10/2022 11:45 AM    GLUCOSE 152 09/10/2022 11:45 AM    CALCIUM 9.7 09/10/2022 11:45 AM     Lab Results   Component Value Date/Time    ALKPHOS 62 09/10/2022 11:45 AM    ALT 30 09/10/2022 11:45 AM    AST 18 09/10/2022 11:45 AM    PROT 7.5 09/10/2022 11:45 AM    BILITOT 0.4 09/10/2022 11:45 AM    LABALBU 4.8 09/10/2022 11:45 AM     Lab Results   Component Value Date/Time    AMYLASE 43 11/26/2012 08:30 PM     Lab Results   Component Value Date/Time    LIPASE 22 04/02/2021 06:00 AM     No results found for: INR    Radiologic Studies:  EXAMINATION:   CT OF THE ABDOMEN AND PELVIS WITH CONTRAST 9/10/2022 12:14 pm       TECHNIQUE:   CT of the abdomen and pelvis was performed with the administration of   intravenous contrast. Multiplanar reformatted images are provided for review. Automated exposure control, iterative reconstruction, and/or weight based   adjustment of the mA/kV was utilized to reduce the radiation dose to as low   as reasonably achievable. COMPARISON:   CT abdomen and pelvis performed 07/30/2017.        HISTORY:   ORDERING SYSTEM PROVIDED HISTORY: abdominal hernia   TECHNOLOGIST PROVIDED HISTORY:       abdominal hernia   Decision Support Exception - unselect if not a suspected or confirmed   emergency medical condition->Emergency Medical Condition (MA)       FINDINGS:   Lower Chest: The lung bases are without consolidation or effusion. The   visualized cardiac structures are unremarkable. Organs: There is hepatic steatosis. The gallbladder, pancreas, spleen, and   adrenal glands are unremarkable. The kidneys are without obstructive   uropathy. The urinary bladder is unremarkable. GI/Bowel: The stomach is contracted and otherwise unremarkable. Loops of   small bowel are normal in caliber without evidence for obstruction. The   colon contains air and fecal residue. There is no free air or free fluid. Pelvis: The prostate gland and seminal vesicles are unremarkable. Peritoneum/Retroperitoneum: The psoas muscles are symmetric. The abdominal   aorta is normal in caliber. The inferior vena cava is unremarkable. There   is no retroperitoneal or mesenteric adenopathy. Bones/Soft Tissues: There is a tiny fat containing midline anterior abdominal   hernia superior to the umbilicus. There is a small fat containing umbilical   hernia. There is no acute osseous abnormality. Impression   No acute abdominal or pelvic abnormality. Hepatic steatosis. Small midline fat containing hernia superior to the umbilicus. Small fat   containing umbilical hernia. Impressions/Recommendations:     Incarcerated umbilical and ventral hernia reduced, high risk for re-incarceration. Risks and benefits robot repair with mesh discussed. added on for surgery tomorrow. Questions answered. Thank you EMILY Lees CNP for allowing me to participate in the care of your patients.      Rolando Antoine DO, MPH, [address] [place] [address] [phone]

## 2022-10-13 ENCOUNTER — HOSPITAL ENCOUNTER (OUTPATIENT)
Age: 47
Setting detail: OUTPATIENT SURGERY
Discharge: HOME | End: 2022-10-13
Attending: SURGERY | Admitting: SURGERY
Payer: COMMERCIAL

## 2022-10-13 ENCOUNTER — ANESTHESIA (OUTPATIENT)
Dept: OPERATING ROOM | Age: 47
End: 2022-10-13
Payer: COMMERCIAL

## 2022-10-13 ENCOUNTER — ANESTHESIA EVENT (OUTPATIENT)
Dept: OPERATING ROOM | Age: 47
End: 2022-10-13
Payer: COMMERCIAL

## 2022-10-13 VITALS
TEMPERATURE: 97.7 F | RESPIRATION RATE: 18 BRPM | BODY MASS INDEX: 31.15 KG/M2 | HEIGHT: 72 IN | WEIGHT: 230 LBS | DIASTOLIC BLOOD PRESSURE: 91 MMHG | OXYGEN SATURATION: 95 % | HEART RATE: 72 BPM | SYSTOLIC BLOOD PRESSURE: 121 MMHG

## 2022-10-13 DIAGNOSIS — G89.18 ACUTE POSTOPERATIVE PAIN: Primary | ICD-10-CM

## 2022-10-13 PROBLEM — K43.6 INCARCERATED VENTRAL HERNIA: Status: ACTIVE | Noted: 2022-10-13

## 2022-10-13 PROCEDURE — 64488 TAP BLOCK BI INJECTION: CPT | Performed by: NURSE ANESTHETIST, CERTIFIED REGISTERED

## 2022-10-13 PROCEDURE — 49653 PR LAP, VENTRAL HERNIA REPAIR,INCARCERATED: CPT | Performed by: SURGERY

## 2022-10-13 PROCEDURE — 6360000002 HC RX W HCPCS: Performed by: SURGERY

## 2022-10-13 PROCEDURE — A4216 STERILE WATER/SALINE, 10 ML: HCPCS | Performed by: NURSE ANESTHETIST, CERTIFIED REGISTERED

## 2022-10-13 PROCEDURE — 7100000010 HC PHASE II RECOVERY - FIRST 15 MIN: Performed by: SURGERY

## 2022-10-13 PROCEDURE — 7100000000 HC PACU RECOVERY - FIRST 15 MIN: Performed by: SURGERY

## 2022-10-13 PROCEDURE — 3700000001 HC ADD 15 MINUTES (ANESTHESIA): Performed by: SURGERY

## 2022-10-13 PROCEDURE — 6360000002 HC RX W HCPCS: Performed by: NURSE ANESTHETIST, CERTIFIED REGISTERED

## 2022-10-13 PROCEDURE — 2580000003 HC RX 258: Performed by: NURSE ANESTHETIST, CERTIFIED REGISTERED

## 2022-10-13 PROCEDURE — 2709999900 HC NON-CHARGEABLE SUPPLY: Performed by: SURGERY

## 2022-10-13 PROCEDURE — 7100000001 HC PACU RECOVERY - ADDTL 15 MIN: Performed by: SURGERY

## 2022-10-13 PROCEDURE — C1781 MESH (IMPLANTABLE): HCPCS | Performed by: SURGERY

## 2022-10-13 PROCEDURE — 3600000009 HC SURGERY ROBOT BASE: Performed by: SURGERY

## 2022-10-13 PROCEDURE — S2900 ROBOTIC SURGICAL SYSTEM: HCPCS | Performed by: SURGERY

## 2022-10-13 PROCEDURE — 3600000019 HC SURGERY ROBOT ADDTL 15MIN: Performed by: SURGERY

## 2022-10-13 PROCEDURE — 2500000003 HC RX 250 WO HCPCS: Performed by: NURSE ANESTHETIST, CERTIFIED REGISTERED

## 2022-10-13 PROCEDURE — 6370000000 HC RX 637 (ALT 250 FOR IP): Performed by: NURSE ANESTHETIST, CERTIFIED REGISTERED

## 2022-10-13 PROCEDURE — 7100000011 HC PHASE II RECOVERY - ADDTL 15 MIN: Performed by: SURGERY

## 2022-10-13 PROCEDURE — 3700000000 HC ANESTHESIA ATTENDED CARE: Performed by: SURGERY

## 2022-10-13 DEVICE — PATCH HERN L DIA3.2IN CIR W/ STRP SEPRA TECHNOLOGY ABSRB: Type: IMPLANTABLE DEVICE | Site: ABDOMEN | Status: FUNCTIONAL

## 2022-10-13 RX ORDER — PROPOFOL 10 MG/ML
INJECTION, EMULSION INTRAVENOUS PRN
Status: DISCONTINUED | OUTPATIENT
Start: 2022-10-13 | End: 2022-10-13 | Stop reason: SDUPTHER

## 2022-10-13 RX ORDER — ONDANSETRON 2 MG/ML
INJECTION INTRAMUSCULAR; INTRAVENOUS PRN
Status: DISCONTINUED | OUTPATIENT
Start: 2022-10-13 | End: 2022-10-13 | Stop reason: SDUPTHER

## 2022-10-13 RX ORDER — GABAPENTIN 400 MG/1
400 CAPSULE ORAL ONCE
Status: COMPLETED | OUTPATIENT
Start: 2022-10-13 | End: 2022-10-13

## 2022-10-13 RX ORDER — ROCURONIUM BROMIDE 10 MG/ML
INJECTION, SOLUTION INTRAVENOUS PRN
Status: DISCONTINUED | OUTPATIENT
Start: 2022-10-13 | End: 2022-10-13 | Stop reason: SDUPTHER

## 2022-10-13 RX ORDER — NEOSTIGMINE METHYLSULFATE 1 MG/ML
INJECTION, SOLUTION INTRAVENOUS PRN
Status: DISCONTINUED | OUTPATIENT
Start: 2022-10-13 | End: 2022-10-13 | Stop reason: SDUPTHER

## 2022-10-13 RX ORDER — MIDAZOLAM HYDROCHLORIDE 1 MG/ML
INJECTION INTRAMUSCULAR; INTRAVENOUS PRN
Status: DISCONTINUED | OUTPATIENT
Start: 2022-10-13 | End: 2022-10-13 | Stop reason: SDUPTHER

## 2022-10-13 RX ORDER — DEXAMETHASONE SODIUM PHOSPHATE 10 MG/ML
INJECTION, SOLUTION INTRAMUSCULAR; INTRAVENOUS PRN
Status: DISCONTINUED | OUTPATIENT
Start: 2022-10-13 | End: 2022-10-13 | Stop reason: SDUPTHER

## 2022-10-13 RX ORDER — SODIUM CHLORIDE, SODIUM LACTATE, POTASSIUM CHLORIDE, CALCIUM CHLORIDE 600; 310; 30; 20 MG/100ML; MG/100ML; MG/100ML; MG/100ML
INJECTION, SOLUTION INTRAVENOUS CONTINUOUS
Status: DISCONTINUED | OUTPATIENT
Start: 2022-10-13 | End: 2022-10-13 | Stop reason: HOSPADM

## 2022-10-13 RX ORDER — DEXAMETHASONE SODIUM PHOSPHATE 4 MG/ML
INJECTION, SOLUTION INTRA-ARTICULAR; INTRALESIONAL; INTRAMUSCULAR; INTRAVENOUS; SOFT TISSUE PRN
Status: DISCONTINUED | OUTPATIENT
Start: 2022-10-13 | End: 2022-10-13 | Stop reason: SDUPTHER

## 2022-10-13 RX ORDER — ONDANSETRON 2 MG/ML
4 INJECTION INTRAMUSCULAR; INTRAVENOUS
Status: DISCONTINUED | OUTPATIENT
Start: 2022-10-13 | End: 2022-10-13 | Stop reason: HOSPADM

## 2022-10-13 RX ORDER — GLYCOPYRROLATE 1 MG/5 ML
SYRINGE (ML) INTRAVENOUS PRN
Status: DISCONTINUED | OUTPATIENT
Start: 2022-10-13 | End: 2022-10-13 | Stop reason: SDUPTHER

## 2022-10-13 RX ORDER — SODIUM CHLORIDE 9 MG/ML
INJECTION, SOLUTION INTRAVENOUS PRN
Status: DISCONTINUED | OUTPATIENT
Start: 2022-10-13 | End: 2022-10-13 | Stop reason: HOSPADM

## 2022-10-13 RX ORDER — ROPIVACAINE HYDROCHLORIDE 5 MG/ML
INJECTION, SOLUTION EPIDURAL; INFILTRATION; PERINEURAL PRN
Status: DISCONTINUED | OUTPATIENT
Start: 2022-10-13 | End: 2022-10-13 | Stop reason: SDUPTHER

## 2022-10-13 RX ORDER — ACETAMINOPHEN 325 MG/1
650 TABLET ORAL ONCE
Status: COMPLETED | OUTPATIENT
Start: 2022-10-13 | End: 2022-10-13

## 2022-10-13 RX ORDER — SODIUM CHLORIDE 9 MG/ML
INJECTION INTRAVENOUS PRN
Status: DISCONTINUED | OUTPATIENT
Start: 2022-10-13 | End: 2022-10-13 | Stop reason: SDUPTHER

## 2022-10-13 RX ORDER — METOCLOPRAMIDE HYDROCHLORIDE 5 MG/ML
10 INJECTION INTRAMUSCULAR; INTRAVENOUS
Status: DISCONTINUED | OUTPATIENT
Start: 2022-10-13 | End: 2022-10-13 | Stop reason: HOSPADM

## 2022-10-13 RX ORDER — KETOROLAC TROMETHAMINE 30 MG/ML
INJECTION, SOLUTION INTRAMUSCULAR; INTRAVENOUS PRN
Status: DISCONTINUED | OUTPATIENT
Start: 2022-10-13 | End: 2022-10-13 | Stop reason: SDUPTHER

## 2022-10-13 RX ORDER — HYDROCODONE BITARTRATE AND ACETAMINOPHEN 5; 325 MG/1; MG/1
1 TABLET ORAL
Qty: 16 TABLET | Refills: 0 | Status: SHIPPED | OUTPATIENT
Start: 2022-10-13 | End: 2022-10-20

## 2022-10-13 RX ORDER — LIDOCAINE HYDROCHLORIDE 20 MG/ML
INJECTION, SOLUTION EPIDURAL; INFILTRATION; INTRACAUDAL; PERINEURAL PRN
Status: DISCONTINUED | OUTPATIENT
Start: 2022-10-13 | End: 2022-10-13 | Stop reason: SDUPTHER

## 2022-10-13 RX ORDER — DIMENHYDRINATE 50 MG/1
50 TABLET ORAL ONCE
Status: COMPLETED | OUTPATIENT
Start: 2022-10-13 | End: 2022-10-13

## 2022-10-13 RX ORDER — SODIUM CHLORIDE 0.9 % (FLUSH) 0.9 %
5-40 SYRINGE (ML) INJECTION EVERY 12 HOURS SCHEDULED
Status: DISCONTINUED | OUTPATIENT
Start: 2022-10-13 | End: 2022-10-13 | Stop reason: HOSPADM

## 2022-10-13 RX ORDER — SODIUM CHLORIDE 0.9 % (FLUSH) 0.9 %
5-40 SYRINGE (ML) INJECTION PRN
Status: DISCONTINUED | OUTPATIENT
Start: 2022-10-13 | End: 2022-10-13 | Stop reason: HOSPADM

## 2022-10-13 RX ORDER — ONDANSETRON 4 MG/1
4 TABLET, FILM COATED ORAL EVERY 8 HOURS PRN
Qty: 15 TABLET | Refills: 0 | Status: SHIPPED | OUTPATIENT
Start: 2022-10-13

## 2022-10-13 RX ORDER — OXYCODONE HYDROCHLORIDE 5 MG/1
5 TABLET ORAL
Status: DISCONTINUED | OUTPATIENT
Start: 2022-10-13 | End: 2022-10-13 | Stop reason: HOSPADM

## 2022-10-13 RX ADMIN — MIDAZOLAM 2 MG: 1 INJECTION INTRAMUSCULAR; INTRAVENOUS at 15:44

## 2022-10-13 RX ADMIN — ONDANSETRON 4 MG: 2 INJECTION, SOLUTION INTRAMUSCULAR; INTRAVENOUS at 18:00

## 2022-10-13 RX ADMIN — ROCURONIUM BROMIDE 50 MG: 10 INJECTION, SOLUTION INTRAVENOUS at 16:47

## 2022-10-13 RX ADMIN — LIDOCAINE HYDROCHLORIDE 100 MG: 20 INJECTION, SOLUTION EPIDURAL; INFILTRATION; INTRACAUDAL; PERINEURAL at 16:47

## 2022-10-13 RX ADMIN — CEFAZOLIN 2000 MG: 10 INJECTION, POWDER, FOR SOLUTION INTRAVENOUS at 16:39

## 2022-10-13 RX ADMIN — ROPIVACAINE HYDROCHLORIDE 60 ML: 5 INJECTION, SOLUTION EPIDURAL; INFILTRATION; PERINEURAL at 15:51

## 2022-10-13 RX ADMIN — DEXAMETHASONE SODIUM PHOSPHATE 4 MG: 4 INJECTION, SOLUTION INTRAMUSCULAR; INTRAVENOUS at 16:47

## 2022-10-13 RX ADMIN — PROPOFOL 200 MG: 10 INJECTION, EMULSION INTRAVENOUS at 16:47

## 2022-10-13 RX ADMIN — DEXAMETHASONE SODIUM PHOSPHATE 2 MG: 4 INJECTION, SOLUTION INTRAMUSCULAR; INTRAVENOUS at 15:51

## 2022-10-13 RX ADMIN — Medication 0.4 MG: at 18:00

## 2022-10-13 RX ADMIN — ACETAMINOPHEN 650 MG: 325 TABLET ORAL at 15:39

## 2022-10-13 RX ADMIN — GABAPENTIN 400 MG: 400 CAPSULE ORAL at 15:39

## 2022-10-13 RX ADMIN — SODIUM CHLORIDE, POTASSIUM CHLORIDE, SODIUM LACTATE AND CALCIUM CHLORIDE: 600; 310; 30; 20 INJECTION, SOLUTION INTRAVENOUS at 15:19

## 2022-10-13 RX ADMIN — KETOROLAC TROMETHAMINE 30 MG: 30 INJECTION, SOLUTION INTRAMUSCULAR at 18:00

## 2022-10-13 RX ADMIN — DEXAMETHASONE SODIUM PHOSPHATE 10 MG: 10 INJECTION, SOLUTION INTRAMUSCULAR; INTRAVENOUS at 15:51

## 2022-10-13 RX ADMIN — NEOSTIGMINE METHYLSULFATE 3 MG: 1 INJECTION, SOLUTION INTRAVENOUS at 18:00

## 2022-10-13 RX ADMIN — SODIUM CHLORIDE 30 ML: 9 INJECTION, SOLUTION INTRAMUSCULAR; INTRAVENOUS; SUBCUTANEOUS at 15:51

## 2022-10-13 RX ADMIN — DIMENHYDRINATE 50 MG: 50 TABLET ORAL at 15:39

## 2022-10-13 ASSESSMENT — PAIN SCALES - GENERAL
PAINLEVEL_OUTOF10: 5

## 2022-10-13 ASSESSMENT — PAIN - FUNCTIONAL ASSESSMENT: PAIN_FUNCTIONAL_ASSESSMENT: NONE - DENIES PAIN

## 2022-10-13 NOTE — OP NOTE
Operative Note      Patient: Adela Ortiz  YOB: 1975  MRN: [id number]  Dot Garduno, APRN - CNP      Date of Procedure: 10/13/2022    Pre-Op Diagnosis: incarcerated ventral hernia    Post-Op Diagnosis: Same       Procedure(s):  Robotic assisted laparoscopic ventral incisional incarcerated hernia repair with mesh     Surgeon(s):  Keya Silverman,       Anesthesia: General + US guided TAP block    Estimated Blood Loss (mL): less than 10 ml    Complications: None    Specimens:   * No specimens in log *    Implants:  Implant Name Type Inv. Item Serial No.  Lot No. LRB No. Used Action   PATCH GLORY L DIA3. 2IN CIR W/ STRP [employer] ABSRB - [id number]  PATCH GLORY L DIA3. 2IN CIR W/ STRP [employer] ABS[employer]  BARD DAVOL-WD [id number] N/A 1 Implanted         Counts: correct    Procedure details:     Please see H&P for indications. I do meet with patient in pre-op. Pre-op IV antibiotics administered. Patient is brought to operative suite, placed under general anesthesia, patient voided in pre-op so no cabrales placed. Anesthesia performed US guided TAP block. Abdomen was re-prepped and draped in sterile fashion. Surgical pause performed. Pneumoperitoneum was established with Veress needle in the LUQ. Optiview 10 mm port with camera inside inserted into the abdominal cavity in a controlled manner without difficulty and the abdominopelvic cavity scanned after confirmation and removal of Veress. Additional assist 8 mm ports are placed along the left lateral abdominal wall appropriately spaced to optimize function. Fenestrated bipolar is used in left arm, and robotic hot endoshears is used in right arm. The Da Len X robot is docked and console controls overtaken. Findings: large amount of incarcerated greater omentum reduced from ventral hernia defect and umbilical hernia defect. Trapped reactive seroma within hernia sac noted.   The hernia sac is excised out and the defects are closed with 0 V-genesis suture. A large circular ventralex mesh was then sewn in IPOM fashion over the repair with running 2-0 V-genesis absorbable sutures circumferentially in small bites to avoid gaps. Likely will develop a temporary post-op seroma. Abdominal binder was placed. All sponge, needle, and instrument counts are correct. The ports are removed under direct visualization, the pneumoperitoneum is released. All incisions are closed in subcuticular fashion. I spoke with family afterwards. Please see discharge instructions.          Electronically signed by Ashvin Wharton DO, FACOS, GELY on 10/15/2022 at 6:27 PM

## 2022-10-13 NOTE — H&P
GENERAL SURGERY CONSULTATION      Patient's Name/ Date of Birth/ Gender: Sergio Kauffman / 1975 (41 y.o.) / male     PCP: EMILY Rosado CNP  Referring:     History of present Illness:  Patient is a pleasant 41 y.o. male  kindly referred by EMILY Rosado CNP    Went to ER for severe abdominal pain, had hernia reduced, works at [employer], does lifting, no prior abdominal surgeries before. Hernia was incarcerated having pain sever enough to have to go to the ER. High risk for re-incarceration after being reduced. Past Medical History:  has a past medical history of Anxiety, Depression, Hernia, abdominal, and Hyperlipidemia. Past Surgical History:   Past Surgical History:   Procedure Laterality Date    HERNIA REPAIR Right     inguinal    LYMPH NODE BIOPSY         Social History:  reports that he has quit smoking. His smoking use included cigarettes. He smoked an average of .5 packs per day. His smokeless tobacco use includes chew. He reports current alcohol use of about 42.0 standard drinks per week. He reports that he does not use drugs. Family History: family history includes Other in his mother.     Review of Systems:   General: Completed and, except as mentioned above, was negative or noncontributory  Psychological:  Completed and, except as mentioned above, was negative or noncontributory  Ophthalmic:  Completed and, except as mentioned above, was negative or noncontributory  ENT:  Completed and, except as mentioned above, was negative or noncontributory  Allergy and Immunology:  Completed and, except as mentioned above, was negative or noncontributory  Hematological and Lymphatic:  Completed and, except as mentioned above, was negative or noncontributory  Endocrine: Completed and, except as mentioned above, was negative or noncontributory  Breast:  Completed and, except as mentioned above, was negative or noncontributory  Respiratory:  Completed and, except as mentioned above, was negative or noncontributory  Cardiovascular:  Completed and, except as mentioned above, was negative or noncontributory  Gastrointestinal: Completed and, except as mentioned above, was negative or noncontributory  Genito-Urinary:  Completed and, except as mentioned above, was negative or noncontributory  Musculoskeletal:  Completed and, except as mentioned above, was negative or noncontributory  Neurological:  Completed and, except as mentioned above, was negative or noncontributory  Dermatological:  Completed and, except as mentioned above, was negative or noncontributory    Allergies: Bee venom    Current Meds:  Current Facility-Administered Medications:     ceFAZolin (ANCEF) 2000 mg in dextrose 5 % 100 mL IVPB, 2,000 mg, IntraVENous, Once, Chelo I Karizemi, DO    lactated ringers infusion, , IntraVENous, Continuous, Richad Ozark, APRN - CRNA, Last Rate: 100 mL/hr at 10/13/22 1602, NoRateChange at 10/13/22 1602    Facility-Administered Medications Ordered in Other Encounters:     ropivacaine (NAROPIN) 0.5% injection, , Spinal/Regional, PRN, Richad Ozark, APRN - CRNA, 60 mL at 10/13/22 1551    dexamethasone (PF) (DECADRON) injection, , Other, PRN, Richad Katie, APRN - CRNA, 10 mg at 10/13/22 1551    dexamethasone (DECADRON) injection, , Other, PRN, Richad Katie, APRN - CRNA, 2 mg at 10/13/22 1551    sodium chloride (PF) 0.9 % injection, , Other, PRN, Richad Ozark, APRN - CRNA, 30 mL at 10/13/22 1551    midazolam (VERSED) injection, , IntraVENous, PRN, Richad Ozark, APRN - CRNA, 2 mg at 10/13/22 1544    Physical Exam:  Vital signs and Nurse's note reviewed. /82   Pulse 83   Temp 97.8 °F (36.6 °C) (Temporal)   Resp 16   Ht 5' 11.5\" (1.816 m)   Wt 230 lb (104.3 kg)   SpO2 96%   BMI 31.63 kg/m²    height is 5' 11.5\" (1.816 m) and weight is 230 lb (104.3 kg). His temporal temperature is 97.8 °F (36.6 °C). His blood pressure is 130/82 and his pulse is 83.  His respiration is 16 and oxygen saturation is 96%. Gen:  A&Ox3, NAD. Pleasant and cooperative. HEENT: PERRLA, EOMI, no scleral icterus  Neck:  no goiter  CVS: Regular rate and rhythm  Resp: Good bilateral air entry, no active wheezing, no labored breathing  Abd: soft, non-tender, non-distended, bowel sounds present, incarcerated umbilical and ventral hernia, reduced, high risk for re-incarceration  Ext: Moves all extremities, no gross focal motor deficits  Skin: No erythema or ulcerations     Labs:   Lab Results   Component Value Date/Time    WBC 7.9 09/10/2022 11:45 AM    HGB 13.9 09/10/2022 11:45 AM    HCT 40.6 09/10/2022 11:45 AM    MCV 92.9 09/10/2022 11:45 AM     09/10/2022 11:45 AM     Lab Results   Component Value Date/Time     09/10/2022 11:45 AM    K 4.1 09/10/2022 11:45 AM    CL 99 09/10/2022 11:45 AM    CO2 26 09/10/2022 11:45 AM    BUN 17 09/10/2022 11:45 AM    CREATININE 0.81 09/10/2022 11:45 AM    GLUCOSE 152 09/10/2022 11:45 AM    CALCIUM 9.7 09/10/2022 11:45 AM     Lab Results   Component Value Date/Time    ALKPHOS 62 09/10/2022 11:45 AM    ALT 30 09/10/2022 11:45 AM    AST 18 09/10/2022 11:45 AM    PROT 7.5 09/10/2022 11:45 AM    BILITOT 0.4 09/10/2022 11:45 AM    LABALBU 4.8 09/10/2022 11:45 AM     Lab Results   Component Value Date/Time    AMYLASE 43 11/26/2012 08:30 PM     Lab Results   Component Value Date/Time    LIPASE 22 04/02/2021 06:00 AM     No results found for: INR    Radiologic Studies:      Impressions/Recommendations:     Incarcerated umbilical and ventral hernia reduced, high risk for re-incarceration. Risks and benefits robot repair with mesh discussed. added on for surgery tomorrow. Questions answered. Thank you EMILY Montesinos - CNP for allowing me to participate in the care of your patients.      Dheeraj Alvarado DO, MPH, [address] [place] [address] Surgery  [address] [phone]      H&P  General Surgery        Pt Name: Sarthak Chase  MRN: [id number]  YOB: 1975  Date of evaluation: 10/13/2022      [x] I have examined the patient and reviewed the H&P/Consult completed, and there are no changes to the patient or plans. [] I have examined the patient and reviewed the H&P/Consult and have noted the following changes: The patient was counseled at length about the risks of gisele Covid-19 during their perioperative period and any recovery window from their procedure. The patient was made aware that gisele Covid-19  may worsen their prognosis for recovering from their procedure  and lend to a higher morbidity and/or mortality risk. All material risks, benefits, and reasonable alternatives including postponing the procedure were discussed. The patient does wish to proceed with the procedure at this time.         Electronically signed by Fidelina Samaniego DO  on 10/13/2022 at 4:09 PM

## 2022-10-13 NOTE — DISCHARGE INSTRUCTIONS
Post-Operative Instructions for Robotic/Laparoscopic Umbilical/Ventral Hernia Repairs      Your surgery went well. Follow-up in clinic 1 week please with Dr. Yessy Finley, call for appt [phone]    Expect some pulling tugging, numbness, swelling, puffiness, bruising, drainage \"hardness\" all over belly and incisions along left side for 3-4 weeks. Ok to shower after 24 hours after arriving home    You have small skin staples to stay in until next office visit. Ok to remove bandages in 24-48 hours. Ok to leave open to air or cover with bandaids. Wear binder snugly during the daytime when up and about x 1 week if tolerated, not necessary but encouraged. Wear the binder over a tshirt or sweatshirt  No driving for 1  week and if still taking norco for pain  Ice pack helps reduce swelling and pain over the incisions  NO heating pads. Call office if fevers over 101, uncontrolled nausea or vomiting. Full liquid diet for the rest of the day. Wait till you pass gas or have a bowel movement before going back to your regular diet     Ok to walk a lot and climb stairs. Just go at a slower than usual pace. It's better to be standing or laying flat or sitting at arecline as best as reasonably possible  Avoid straining/constipation. Take over the counter stool softeners as needed. If no bowel movement in 2-3 days take any over the counter laxative such as Magnesium Citrate (comes in a glass bottle), or Miralax powder as directed. No lifting over 15 pounds for 6 weeks        SAME DAY SURGERY DISCHARGE INSTRUCTIONS    1. Do not drive or operate hazardous machinery for 24 hours. 2.  Do not make important personal or business decisions for 24 hours. 3.  Do not drink alcoholic beverages for 24 hours. 4.  Do not smoke tobacco products for 24 hours. 5.  Eat light foods (Jell-O, soups, etc....) and drink plenty of fluids (water, Sprite, etc...) up to 8 glasses per day, as you can tolerate.     6.  If your bandages become soaked with bright red blood, place another dressing pad over your bandages. (DO NOT remove original bandage.)  Call your surgeon for further instructions. A small amount of bright red blood is to be expected. 7.  Limit your activities for 24 hours. Do not engage in heavy work until your surgeon gives you permission. 8.  Report the following signs or any questions regarding your physical condition to your surgeon immediately:    Excessive swelling of, or around the wound area. Redness. Temperature of 100 degrees (F) or above. Excessive pain. 9.  Call your surgeon for any questions regarding your surgery. 10.  Call for an appointment to see your surgeon in 1 week. 11.  Wash hands before and after incision care. It is important to practice good personal hygiene during the post op period.

## 2022-10-13 NOTE — ANESTHESIA PROCEDURE NOTES
Peripheral Block    Patient location during procedure: holding area  Reason for block: post-op pain management and at surgeon's request  Start time: 10/13/2022 3:44 PM  End time: 10/13/2022 3:51 PM  Staffing  Performed: resident/CRNA   Resident/CRNA: EMILY Ho CRNA  Preanesthetic Checklist  Completed: patient identified, IV checked, site marked, risks and benefits discussed, surgical/procedural consents, equipment checked, pre-op evaluation, timeout performed, anesthesia consent given, oxygen available, monitors applied/VS acknowledged, fire risk safety assessment completed and verbalized and blood product R/B/A discussed and consented  Peripheral Block   Patient position: supine  Prep: ChloraPrep and site prepped and draped  Provider prep: sterile gloves and mask  Patient monitoring: continuous pulse ox, IV access and responsive to questions (EKG, NIBP, and O2 immediately available)  Block type: TAP and Rectus sheath  Laterality: bilateral  Injection technique: single-shot  Guidance: ultrasound guided  Local infiltration: ropivacaine and decadron  Local infiltration: ropivacaine and decadron    Needle   Needle type: short-bevel   Needle gauge: 22 G  Needle localization: ultrasound guidance  Needle length: 8 cm  Assessment   Injection assessment: negative aspiration for heme, no paresthesia on injection and no intravascular symptoms (local spread in fascial planes observed)  Paresthesia pain: none  Slow fractionated injection: yes  Hemodynamics: stable  Real-time US image taken/store: yes  Outcomes: uncomplicated and patient tolerated procedure well    Additional Notes  20 ml ropivacaine 0.5%+4mg PF dexamethasone+10ml NaCl inj per side for TAP blocks  10 ml ropivacaine 0.5%+2mg dexamethasone +5ml NaCl inj per side for rectus sheath blocks

## 2022-10-13 NOTE — ANESTHESIA PRE PROCEDURE
Department of Anesthesiology  Preprocedure Note       Name:  Adela Ortiz   Age:  41 y.o.  :  1975                                          MRN:  [id number]         Date:  10/13/2022      Surgeon: Ayden Castillo):  Keya Silverman DO    Procedure: Procedure(s): HERNIA VENTRAL REPAIR LAPAROSCOPIC ROBOTIC - INCARCERATED    Medications prior to admission:   Prior to Admission medications    Medication Sig Start Date End Date Taking? Authorizing Provider   blood glucose monitor strips Test 2 times a day & as needed for symptoms of irregular blood glucose. Dispense sufficient amount for indicated testing frequency plus additional to accommodate PRN testing needs. 22   Alexis Kinds, APRN - CNP   glucose monitoring (FREESTYLE FREEDOM) kit 1 kit by Does not apply route daily 22   Alexis Kinds, APRN - CNP   Accu-Chek Multiclix Lancets MISC 1 dose pack by Does not apply route 2 times daily 4/11/22 7/10/22  Alexis Kinds, APRN - CNP       Current medications:    Current Facility-Administered Medications   Medication Dose Route Frequency Provider Last Rate Last Admin    ceFAZolin (ANCEF) 2000 mg in dextrose 5 % 100 mL IVPB  2,000 mg IntraVENous Once Chelo Delacruz DO        lactated ringers infusion   IntraVENous Continuous Riley Solar, APRN - CRNA 100 mL/hr at 10/13/22 1519 New Bag at 10/13/22 1519    dimenhyDRINATE (DRAMAMINE) tablet 50 mg  50 mg Oral Once Riley Solar, APRN - CRNA        gabapentin (NEURONTIN) capsule 400 mg  400 mg Oral Once Riley Solar, APRN - CRNA        acetaminophen (TYLENOL) tablet 650 mg  650 mg Oral Once Riley Solar, APRN - CRNA           Allergies: Allergies   Allergen Reactions    Bee Venom      swelling       Problem List:  There is no problem list on file for this patient.       Past Medical History:        Diagnosis Date    Anxiety     Depression     Hernia, abdominal     Hyperlipidemia        Past Surgical History:        Procedure Laterality Date    HERNIA REPAIR Right     inguinal    LYMPH NODE BIOPSY         Social History:    Social History     Tobacco Use    Smoking status: Former     Packs/day: 0.50     Types: Cigarettes    Smokeless tobacco: Current     Types: Chew   Substance Use Topics    Alcohol use: Yes     Alcohol/week: 42.0 standard drinks     Types: 42 Cans of beer per week     Comment: 6 beers daily                                Ready to quit: Not Answered  Counseling given: Not Answered      Vital Signs (Current):   Vitals:    10/13/22 1508   BP: 130/82   Pulse: 83   Resp: 16   Temp: 36.6 °C (97.8 °F)   TempSrc: Temporal   SpO2: 96%   Weight: 230 lb (104.3 kg)   Height: 5' 11.5\" (1.816 m)                                              BP Readings from Last 3 Encounters:   10/13/22 130/82   10/12/22 126/88   09/10/22 (!) 130/98       NPO Status: Time of last liquid consumption: 2300                        Time of last solid consumption: 2300                        Date of last liquid consumption: 10/12/22                        Date of last solid food consumption: 10/12/22    BMI:   Wt Readings from Last 3 Encounters:   10/13/22 230 lb (104.3 kg)   10/12/22 246 lb (111.6 kg)   09/10/22 230 lb (104.3 kg)     Body mass index is 31.63 kg/m².     CBC:   Lab Results   Component Value Date/Time    WBC 7.9 09/10/2022 11:45 AM    RBC 4.37 09/10/2022 11:45 AM    HGB 13.9 09/10/2022 11:45 AM    HCT 40.6 09/10/2022 11:45 AM    MCV 92.9 09/10/2022 11:45 AM    RDW 11.9 09/10/2022 11:45 AM     09/10/2022 11:45 AM       CMP:   Lab Results   Component Value Date/Time     09/10/2022 11:45 AM    K 4.1 09/10/2022 11:45 AM    CL 99 09/10/2022 11:45 AM    CO2 26 09/10/2022 11:45 AM    BUN 17 09/10/2022 11:45 AM    CREATININE 0.81 09/10/2022 11:45 AM    GFRAA >60 09/10/2022 11:45 AM    LABGLOM >60 09/10/2022 11:45 AM    GLUCOSE 152 09/10/2022 11:45 AM    PROT 7.5 09/10/2022 11:45 AM    CALCIUM 9.7 09/10/2022 11:45 AM    BILITOT 0.4 09/10/2022 11:45 AM    ALKPHOS 62 09/10/2022 11:45 AM    AST 18 09/10/2022 11:45 AM    ALT 30 09/10/2022 11:45 AM       POC Tests: No results for input(s): POCGLU, POCNA, POCK, POCCL, POCBUN, POCHEMO, POCHCT in the last 72 hours. Coags: No results found for: PROTIME, INR, APTT    HCG (If Applicable): No results found for: PREGTESTUR, PREGSERUM, HCG, HCGQUANT     ABGs: No results found for: PHART, PO2ART, OFP3KGK, QED4HBJ, BEART, K3CTRAHG     Type & Screen (If Applicable):  No results found for: LABABO, LABRH    Drug/Infectious Status (If Applicable):  No results found for: HIV, HEPCAB    COVID-19 Screening (If Applicable):   Lab Results   Component Value Date/Time    COVID19 Not Detected 02/27/2022 01:55 PM           Anesthesia Evaluation  Patient summary reviewed and Nursing notes reviewed no history of anesthetic complications:   Airway: Mallampati: I  TM distance: >3 FB   Neck ROM: full  Mouth opening: > = 3 FB   Dental:      Comment: patient denies crowns, implants, caps, etc    Pulmonary:Negative Pulmonary ROS and normal exam                               Cardiovascular:    (+) hyperlipidemia                  Neuro/Psych:   (+) depression/anxiety    (-) psychiatric history           GI/Hepatic/Renal: Neg GI/Hepatic/Renal ROS            Endo/Other:                      ROS comment: hperglycemia noted per labs, patient states he is monitoring and managing through diet Abdominal:             Vascular: Other Findings:           Anesthesia Plan      general     ASA 2             Anesthetic plan and risks discussed with patient.               Post-op pain plan if not by surgeon: single peripheral nerve block            EMILY Salgado - CRNA   10/13/2022

## 2022-10-13 NOTE — PROGRESS NOTES
Patient verbalizes readiness for discharge. Discharge instructions given to patient and pt's mother, answered all questions, and verbalized understanding of discharge instructions. Discharge Criteria    Inpatients must meet Criteria 1 through 7. All other patients are either YES or N/A. If a NO is chosen then Anesthesia or Surgeon must be notified. 1.  Minimum 30 minutes after last dose of sedative medication, minimum 120 minutes after last dose of reversal agent. Yes      2. Systolic BP stable within 20 mmHg for 30 minutes & systolic BP between 90 & 725 or within 10 mmHg of baseline. Yes, Giuliano Connors CRNA ok with current BP. 3.  Pulse between 60 and 100 or within 10 bpm of baseline. Yes      4. Spontaneous respiratory rate >/= 10 per minute. Yes      5. SaO2 >/= 95 or  >/= baseline. Yes      6. Able to cough and swallow or return to baseline function. Yes      7. Alert and oriented or return to baseline mental status. Yes      8. Demonstrates controlled, coordinated movements, ambulates with steady gait, or return to baseline activity function. Yes      9. Minimal or no pain or nausea, or at a level tolerable and acceptable to patient. Yes      10. Takes and retains oral fluids as allowed. Yes      11. Procedural / perioperative site stable. Minimal or no bleeding. Yes          12. If GI endoscopy procedure, minimal or no abdominal distention or passing flatus. N/A      13. Written discharge instructions and emergency telephone number provided. Yes      14. Accompanied by a responsible adult.     Yes

## 2022-10-13 NOTE — BRIEF OP NOTE
Brief Postoperative Note      Patient: Nancy Emmanuel  YOB: 1975  MRN: [id number]  Broderick Cole, APRN - CNP      Date of Procedure: 10/13/2022    Pre-Op Diagnosis: incarcerated ventral hernia    Post-Op Diagnosis: Same       Procedure(s):  Robotic assisted laparoscopic ventral incisional incarcerated hernia repair with mesh     Surgeon(s):  Nathaly Tariq DO      Anesthesia: General + US guided TAP block    Estimated Blood Loss (mL): less than 10 ml    Complications: None    Specimens:   * No specimens in log *    Implants:  Implant Name Type Inv. Item Serial No.  Lot No. LRB No. Used Action   PATCH GLORY L DIA3. 2IN CIR W/ STRP [employer] - [id number]  PATCH GLORY L DIA3. 2IN CIR W/ STRP [employer]  BARD DAVOL-WD [id number] N/A 1 Implanted         Counts: correct    Electronically signed by Nathaly Tariq DO, FACOS, FACS on 10/15/2022 at 6:27 PM

## 2022-10-14 NOTE — ANESTHESIA POSTPROCEDURE EVALUATION
Department of Anesthesiology  Postprocedure Note    Patient: Dulce Maria Meyers  MRN: [id number]  YOB: 1975  Date of evaluation: 10/14/2022      Procedure Summary     Date: 10/13/22 Room / Location: [address] [place] [address]    Anesthesia Start: [facility] Anesthesia Stop: 1818    Procedure: HERNIA VENTRAL REPAIR LAPAROSCOPIC ROBOTIC - INCARCERATED Diagnosis:       Ventral hernia without obstruction or gangrene      (VENTRAL HERNIA)    Surgeons: Percival Kocher, DO Responsible Provider: Ashish Lawrence. EMILY Little - CRNA    Anesthesia Type: general ASA Status: 2          Anesthesia Type: No value filed.     Heri Phase I: Heri Score: 10    Heri Phase II: Heri Score: 10      Anesthesia Post Evaluation    Patient location during evaluation: PACU  Patient participation: complete - patient participated  Level of consciousness: awake and alert and responsive to noxious stimuli  Pain score: 0  Airway patency: patent  Nausea & Vomiting: no nausea and no vomiting  Complications: no  Cardiovascular status: blood pressure returned to baseline  Respiratory status: acceptable  Hydration status: stable  Multimodal analgesia pain management approach

## 2022-10-15 VITALS
DIASTOLIC BLOOD PRESSURE: 88 MMHG | HEIGHT: 71 IN | WEIGHT: 246 LBS | RESPIRATION RATE: 18 BRPM | HEART RATE: 82 BPM | BODY MASS INDEX: 34.44 KG/M2 | SYSTOLIC BLOOD PRESSURE: 126 MMHG

## 2022-10-24 ENCOUNTER — NURSE ONLY (OUTPATIENT)
Dept: SURGERY | Age: 47
End: 2022-10-24

## 2022-10-24 DIAGNOSIS — Z09 POSTOP CHECK: Primary | ICD-10-CM

## 2022-10-24 PROCEDURE — 99999 PR OFFICE/OUTPT VISIT,PROCEDURE ONLY: CPT | Performed by: SURGERY

## 2022-10-24 NOTE — PROGRESS NOTES
Patient here for staple removal s/p hernia surgery on 10/13/22. Area looks good, no signs of infection. Removed staples with no issues. Answered any questions patient had. Scheduled for follow up in 1 month.

## 2023-02-24 ENCOUNTER — TELEPHONE (OUTPATIENT)
Dept: PRIMARY CARE CLINIC | Age: 48
End: 2023-02-24

## 2025-01-13 ENCOUNTER — HOSPITAL ENCOUNTER (EMERGENCY)
Age: 50
Discharge: HOME | End: 2025-01-13
Payer: OTHER GOVERNMENT

## 2025-01-13 VITALS
SYSTOLIC BLOOD PRESSURE: 109 MMHG | OXYGEN SATURATION: 96 % | HEART RATE: 66 BPM | TEMPERATURE: 97.7 F | BODY MASS INDEX: 30.26 KG/M2 | WEIGHT: 220 LBS | DIASTOLIC BLOOD PRESSURE: 74 MMHG | RESPIRATION RATE: 16 BRPM

## 2025-01-13 DIAGNOSIS — M54.6 ACUTE RIGHT-SIDED THORACIC BACK PAIN: Primary | ICD-10-CM

## 2025-01-13 LAB — GLUCOSE BLD-MCNC: 147 MG/DL (ref 74–100)

## 2025-01-13 PROCEDURE — 99283 EMERGENCY DEPT VISIT LOW MDM: CPT

## 2025-01-13 PROCEDURE — 6370000000 HC RX 637 (ALT 250 FOR IP)

## 2025-01-13 PROCEDURE — 82947 ASSAY GLUCOSE BLOOD QUANT: CPT

## 2025-01-13 RX ORDER — KETOROLAC TROMETHAMINE 30 MG/ML
30 INJECTION, SOLUTION INTRAMUSCULAR; INTRAVENOUS ONCE
Status: DISCONTINUED | OUTPATIENT
Start: 2025-01-13 | End: 2025-01-13 | Stop reason: HOSPADM

## 2025-01-13 RX ORDER — LIDOCAINE 50 MG/G
1 PATCH TOPICAL DAILY
Qty: 10 PATCH | Refills: 0 | Status: SHIPPED | OUTPATIENT
Start: 2025-01-13 | End: 2025-01-23

## 2025-01-13 RX ORDER — CYCLOBENZAPRINE HCL 5 MG
5 TABLET ORAL 2 TIMES DAILY PRN
Qty: 10 TABLET | Refills: 0 | Status: SHIPPED | OUTPATIENT
Start: 2025-01-13 | End: 2025-01-13 | Stop reason: SINTOL

## 2025-01-13 RX ORDER — LIDOCAINE 4 G/G
1 PATCH TOPICAL ONCE
Status: DISCONTINUED | OUTPATIENT
Start: 2025-01-13 | End: 2025-01-13 | Stop reason: HOSPADM

## 2025-01-13 RX ORDER — CYCLOBENZAPRINE HCL 10 MG
10 TABLET ORAL ONCE
Status: DISCONTINUED | OUTPATIENT
Start: 2025-01-13 | End: 2025-01-13 | Stop reason: HOSPADM

## 2025-01-13 ASSESSMENT — LIFESTYLE VARIABLES
HOW MANY STANDARD DRINKS CONTAINING ALCOHOL DO YOU HAVE ON A TYPICAL DAY: 7 TO 9
HOW OFTEN DO YOU HAVE A DRINK CONTAINING ALCOHOL: 4 OR MORE TIMES A WEEK

## 2025-01-13 ASSESSMENT — PAIN SCALES - GENERAL: PAINLEVEL_OUTOF10: 8

## 2025-01-13 ASSESSMENT — PAIN DESCRIPTION - DESCRIPTORS: DESCRIPTORS: SHARP

## 2025-01-13 ASSESSMENT — PAIN DESCRIPTION - LOCATION: LOCATION: BACK;FLANK

## 2025-01-13 ASSESSMENT — PAIN DESCRIPTION - ORIENTATION: ORIENTATION: RIGHT;MID;LOWER

## 2025-01-13 ASSESSMENT — PAIN - FUNCTIONAL ASSESSMENT: PAIN_FUNCTIONAL_ASSESSMENT: 0-10

## 2025-01-13 ASSESSMENT — PAIN DESCRIPTION - PAIN TYPE: TYPE: ACUTE PAIN

## 2025-01-13 ASSESSMENT — PAIN DESCRIPTION - FREQUENCY: FREQUENCY: INTERMITTENT

## 2025-01-13 NOTE — ED PROVIDER NOTES
Anxiety, Depression, Hernia, abdominal, and Hyperlipidemia.     Isaiah Ramirez is a 49 y.o. male     Vital signs /74   Pulse 66   Temp 97.7 °F (36.5 °C) (Oral)   Resp 16   Wt 99.8 kg (220 lb)   SpO2 96%   BMI 30.26 kg/m²   While in the ED patient was afebrile, nontoxic-appearing, in no respiratory distress.   Physical exam remarkable forReproducible chest pain to the lateral posterior chest wall, no obvious bruising or open wounds  Ddx: Working diagnoses include but not limited to muscle injury.    Patient administered:  Orders Placed This Encounter   Medications    DISCONTD: ketorolac (TORADOL) injection 30 mg    DISCONTD: lidocaine 4 % external patch 1 patch    DISCONTD: cyclobenzaprine (FLEXERIL) tablet 10 mg    DISCONTD: cyclobenzaprine (FLEXERIL) 5 MG tablet     Sig: Take 1 tablet by mouth 2 times daily as needed for Muscle spasms     Dispense:  10 tablet     Refill:  0    lidocaine (LIDODERM) 5 %     Sig: Place 1 patch onto the skin daily for 10 days 12 hours on, 12 hours off.     Dispense:  10 patch     Refill:  0     Point-of-care glucose 147.  Patient was placed on lidocaine patch as he did not want muscle relaxants or Toradol.   On reevaluation patient had significant symptomatic relief. Patient would like to go home.     Pt will be d/c and will follow up with his PCP. He is educated on signs and symptoms that require emergent evaluation. Pt is advised to return to the ED if his symptoms change or worsen. If his pain persists, pt may need further evaluation. Pt is agreeable to plan and all questions have been answered at this time.        Please see ED course for more details:         Medications - No data to display    Discharge Medication List as of 1/13/2025  2:28 PM        START taking these medications    Details   lidocaine (LIDODERM) 5 % Place 1 patch onto the skin daily for 10 days 12 hours on, 12 hours off., Disp-10 patch, R-0Normal               Counseling:   The emergency provider has

## 2025-01-13 NOTE — DISCHARGE INSTRUCTIONS
Take your medication as indicated and prescribed.  For pain use acetaminophen (Tylenol) or ibuprofen (Motrin / Advil), unless prescribed medications that have acetaminophen or ibuprofen (or similar medications) in it.  You can take over the counter acetaminophen tablets (1 - 2 tablets of the 500-mg strength every 6 hours) or ibuprofen tablets (2 tablets every 4 hours).    Use an ice pack or bag filled with ice and apply to the injured area 3 - 4 times a day for 15 - 20 minutes each time.  If the injury is older than 3 days, then use a heating pad to help relax the muscles.    PLEASE RETURN TO THE EMERGENCY DEPARTMENT IMMEDIATELY for worsening of pain, worsen swelling, inability to move, or if you develop any concerning symptoms such as: high fever not relieved by acetaminophen (Tylenol) and/or ibuprofen (Motrin / Advil), chills, feeling of your heart fluttering or racing, persistent nausea and/or vomiting, vomiting up blood, blood in your stool, loss of consciousness, numbness, weakness or tingling in the arms or legs or change in color of the extremities, changes in mental status, persistent headache, blurry vision, loss of bladder / bowel control, unable to follow up with your physician, or other any other care or concern.

## 2025-01-14 ENCOUNTER — TELEPHONE (OUTPATIENT)
Dept: PRIMARY CARE CLINIC | Age: 50
End: 2025-01-14

## 2025-01-14 NOTE — TELEPHONE ENCOUNTER
[facility] Transitions Initial Follow Up Call    Outreach made within 2 business days of discharge: Yes    Patient: Isaiah Ramirez Patient : 1975   MRN: [id number]  Reason for Admission: There are no discharge diagnoses documented for the most recent discharge.  Discharge Date: 25       Spoke with: lvm for patient 2025, lvm for patient on 1/15/2025    Discharge department/facility: [facility]     TCM Interactive Patient Contact:  Was patient able to fill all prescriptions:   Was patient instructed to bring all medications to the follow-up visit:   Is patient taking all medications as directed in the discharge summary?   Does patient understand their discharge instructions:   Does patient have questions or concerns that need addressed prior to 7-14 day follow up office visit:     Scheduled appointment with PCP within 7-14 days    Follow Up  No future appointments.    Caprice Sheppard MA

## 2025-01-27 ENCOUNTER — OFFICE VISIT (OUTPATIENT)
Dept: PRIMARY CARE CLINIC | Age: 50
End: 2025-01-27

## 2025-01-27 VITALS
HEART RATE: 84 BPM | WEIGHT: 231.8 LBS | HEIGHT: 72 IN | BODY MASS INDEX: 31.4 KG/M2 | DIASTOLIC BLOOD PRESSURE: 84 MMHG | TEMPERATURE: 97.8 F | SYSTOLIC BLOOD PRESSURE: 118 MMHG | OXYGEN SATURATION: 97 % | RESPIRATION RATE: 16 BRPM

## 2025-01-27 DIAGNOSIS — Z13.220 LIPID SCREENING: ICD-10-CM

## 2025-01-27 DIAGNOSIS — E11.9 TYPE 2 DIABETES MELLITUS WITHOUT COMPLICATION, WITHOUT LONG-TERM CURRENT USE OF INSULIN (HCC): Primary | ICD-10-CM

## 2025-01-27 DIAGNOSIS — Z12.11 COLON CANCER SCREENING: ICD-10-CM

## 2025-01-27 DIAGNOSIS — M54.50 ACUTE RIGHT-SIDED LOW BACK PAIN WITHOUT SCIATICA: ICD-10-CM

## 2025-01-27 LAB — HBA1C MFR BLD: 8 %

## 2025-01-27 PROCEDURE — 3052F HG A1C>EQUAL 8.0%<EQUAL 9.0%: CPT | Performed by: NURSE PRACTITIONER

## 2025-01-27 PROCEDURE — 99215 OFFICE O/P EST HI 40 MIN: CPT | Performed by: NURSE PRACTITIONER

## 2025-01-27 PROCEDURE — 83036 HEMOGLOBIN GLYCOSYLATED A1C: CPT | Performed by: NURSE PRACTITIONER

## 2025-01-27 RX ORDER — ACYCLOVIR 400 MG/1
1 TABLET ORAL
Qty: 4 EACH | Refills: 3 | Status: SHIPPED | OUTPATIENT
Start: 2025-01-27 | End: 2025-02-26

## 2025-01-27 RX ORDER — SEMAGLUTIDE 1.34 MG/ML
1 INJECTION, SOLUTION SUBCUTANEOUS
Qty: 3 ML | Refills: 0 | Status: SHIPPED | OUTPATIENT
Start: 2025-01-27 | End: 2025-02-18

## 2025-01-27 RX ORDER — ACYCLOVIR 400 MG/1
1 TABLET ORAL
Qty: 1 EACH | Refills: 0 | Status: SHIPPED | COMMUNITY
Start: 2025-01-27 | End: 2025-01-28

## 2025-01-27 SDOH — ECONOMIC STABILITY: FOOD INSECURITY: WITHIN THE PAST 12 MONTHS, YOU WORRIED THAT YOUR FOOD WOULD RUN OUT BEFORE YOU GOT MONEY TO BUY MORE.: NEVER TRUE

## 2025-01-27 SDOH — ECONOMIC STABILITY: FOOD INSECURITY: WITHIN THE PAST 12 MONTHS, THE FOOD YOU BOUGHT JUST DIDN'T LAST AND YOU DIDN'T HAVE MONEY TO GET MORE.: NEVER TRUE

## 2025-01-27 ASSESSMENT — PATIENT HEALTH QUESTIONNAIRE - PHQ9
SUM OF ALL RESPONSES TO PHQ9 QUESTIONS 1 & 2: 0
SUM OF ALL RESPONSES TO PHQ QUESTIONS 1-9: 0
2. FEELING DOWN, DEPRESSED OR HOPELESS: NOT AT ALL
1. LITTLE INTEREST OR PLEASURE IN DOING THINGS: NOT AT ALL

## 2025-01-27 ASSESSMENT — ENCOUNTER SYMPTOMS
BACK PAIN: 1
ALLERGIC/IMMUNOLOGIC NEGATIVE: 1
EYES NEGATIVE: 1
RESPIRATORY NEGATIVE: 1
GASTROINTESTINAL NEGATIVE: 1

## 2025-01-27 NOTE — PATIENT INSTRUCTIONS
SURVEY:     You may be receiving a survey from [facility] regarding your visit today.     Please complete the survey to enable us to provide the highest quality of care to you and your family.     If you cannot score us a very good on any question, please call the office to discuss how we could have made your experience a very good one.     Thank you,    Akhil Meyer, APRN-CNP  Serenity Shaw, APRN-CNP  Shanda, LPN  Lyn, CMA  Armin, CMA  Caprice, CMA  Leticia, PCA  Lee Ann, CMA  Mariaa, PM

## 2025-01-27 NOTE — PROGRESS NOTES
[facility]  ANDREA JYA, ELLYN  [facility]  [address] [address]  Dept: [phone]  Dept Fax: [phone]      Name: Isaiah Ramirez  : 1975         Chief Complaint:     Chief Complaint   Patient presents with    Diabetes     Check up.    Follow-up     Patient was in the ED for right sided back pain. Patient still having pain with lifting.        History of Present Illness:      Isaiah Ramirez is a 49 y.o.  male who presents with Diabetes (Check up.) and Follow-up (Patient was in the ED for right sided back pain. Patient still having pain with lifting. )      ADEBAYO Colon is here today for an ER follow up and has not been seen for a couple years.  He has a history of Type 2 Diabetes and has not been compliant with medication use.  He did stop drinking regular soda and states he is very active during the day.  He was checking his sugars at home until he ran out of lancets.   He took Metformin for 3 days and stopped it due to GI side effects.  He did go to Diabetic Education once in the past that was helpful.  He states he is bad at remembering to take daily medications and would like to try a weekly injectable.      He was in the ER for right sided back pain.  He has increased pain with movement.  He states with twisting movements he feels increased pain and then will rest and it will go away.  He put the lidocaine patch on that did help at that time.  He hasn't had that happen since he was in ER.  He does have a constant dull pain in the lower right side of back that \"took him down\" twice.      A1C 8.0 today.    Past Medical History:     Past Medical History:   Diagnosis Date    Anxiety     Depression     Hernia, abdominal     Hyperlipidemia       Reviewed all health maintenance requirements and ordered appropriate tests  Health Maintenance Due   Topic Date Due    Lipids  Never done    Colorectal Cancer Screen  Never done    Flu vaccine (1) Never done

## 2025-02-03 ENCOUNTER — TELEPHONE (OUTPATIENT)
Dept: PRIMARY CARE CLINIC | Age: 50
End: 2025-02-03

## 2025-02-03 NOTE — TELEPHONE ENCOUNTER
Isaiah is calling as he went to work on 1/29/25 and his back pain worsened. He ended up missing work 1/31/25, 2/1/25 & 2/2/25.  He would like a work excuse to RTW 2/6/25.  Isaiah believes resting his back for a week would help     Okay for work excuse 1/30/25 to 2/6/25?

## 2025-02-04 DIAGNOSIS — E11.9 TYPE 2 DIABETES MELLITUS WITHOUT COMPLICATION, WITHOUT LONG-TERM CURRENT USE OF INSULIN (HCC): Primary | ICD-10-CM

## 2025-02-05 ENCOUNTER — TELEPHONE (OUTPATIENT)
Dept: PRIMARY CARE CLINIC | Age: 50
End: 2025-02-05

## 2025-02-05 RX ORDER — HYDROCHLOROTHIAZIDE 12.5 MG/1
CAPSULE ORAL
Qty: 6 EACH | Refills: 3 | Status: SHIPPED | OUTPATIENT
Start: 2025-02-05

## 2025-02-05 NOTE — TELEPHONE ENCOUNTER
Isaiah returned to work yesterday 2/4/25.  He would like a new RTW slip for yesterday.    Letter amended and printed for pt to .

## 2025-02-11 ENCOUNTER — HOSPITAL ENCOUNTER (OUTPATIENT)
Age: 50
Discharge: HOME | End: 2025-02-11
Payer: COMMERCIAL

## 2025-02-11 DIAGNOSIS — Z13.220 LIPID SCREENING: ICD-10-CM

## 2025-02-11 DIAGNOSIS — E11.9 TYPE 2 DIABETES MELLITUS WITHOUT COMPLICATION, WITHOUT LONG-TERM CURRENT USE OF INSULIN (HCC): ICD-10-CM

## 2025-02-11 LAB
ALBUMIN SERPL-MCNC: 4.8 G/DL (ref 3.5–5.2)
ALBUMIN/GLOB SERPL: 1.6 {RATIO} (ref 1–2.5)
ALP SERPL-CCNC: 58 U/L (ref 40–129)
ALT SERPL-CCNC: 22 U/L (ref 10–50)
ANION GAP SERPL CALCULATED.3IONS-SCNC: 12 MMOL/L (ref 9–16)
AST SERPL-CCNC: 18 U/L (ref 10–50)
BASOPHILS # BLD: 0.04 K/UL (ref 0–0.2)
BASOPHILS NFR BLD: 1 % (ref 0–2)
BILIRUB SERPL-MCNC: 0.4 MG/DL (ref 0–1.2)
BUN SERPL-MCNC: 15 MG/DL (ref 6–20)
BUN/CREAT SERPL: 17 (ref 9–20)
CALCIUM SERPL-MCNC: 9.6 MG/DL (ref 8.6–10.4)
CHLORIDE SERPL-SCNC: 99 MMOL/L (ref 98–107)
CHOLEST SERPL-MCNC: 339 MG/DL (ref 0–199)
CHOLESTEROL/HDL RATIO: 7.9
CO2 SERPL-SCNC: 25 MMOL/L (ref 20–31)
CREAT SERPL-MCNC: 0.9 MG/DL (ref 0.7–1.2)
CREAT UR-MCNC: 142 MG/DL (ref 39–259)
EOSINOPHIL # BLD: 0.14 K/UL (ref 0–0.44)
EOSINOPHILS RELATIVE PERCENT: 2 % (ref 1–4)
ERYTHROCYTE [DISTWIDTH] IN BLOOD BY AUTOMATED COUNT: 12.3 % (ref 11.8–14.4)
GFR, ESTIMATED: >90 ML/MIN/1.73M2
GLUCOSE P FAST SERPL-MCNC: 185 MG/DL (ref 74–99)
HCT VFR BLD AUTO: 45.5 % (ref 40.7–50.3)
HDLC SERPL-MCNC: 43 MG/DL
HGB BLD-MCNC: 15.9 G/DL (ref 13–17)
IMM GRANULOCYTES # BLD AUTO: 0.11 K/UL (ref 0–0.3)
IMM GRANULOCYTES NFR BLD: 2 %
LDLC SERPL CALC-MCNC: ABNORMAL MG/DL (ref 0–100)
LDLC SERPL DIRECT ASSAY-MCNC: 220 MG/DL
LYMPHOCYTES NFR BLD: 2.38 K/UL (ref 1.1–3.7)
LYMPHOCYTES RELATIVE PERCENT: 32 % (ref 24–43)
MCH RBC QN AUTO: 32 PG (ref 25.2–33.5)
MCHC RBC AUTO-ENTMCNC: 34.9 G/DL (ref 28.4–34.8)
MCV RBC AUTO: 91.5 FL (ref 82.6–102.9)
MICROALBUMIN UR-MCNC: 47 MG/L (ref 0–20)
MICROALBUMIN/CREAT UR-RTO: 33 MCG/MG CREAT (ref 0–17)
MONOCYTES NFR BLD: 0.71 K/UL (ref 0.1–1.2)
MONOCYTES NFR BLD: 10 % (ref 3–12)
NEUTROPHILS NFR BLD: 53 % (ref 36–65)
NEUTS SEG NFR BLD: 4.11 K/UL (ref 1.5–8.1)
NRBC BLD-RTO: 0 PER 100 WBC
PLATELET # BLD AUTO: 206 K/UL (ref 138–453)
PMV BLD AUTO: 10.5 FL (ref 8.1–13.5)
POTASSIUM SERPL-SCNC: 4.5 MMOL/L (ref 3.7–5.3)
PROT SERPL-MCNC: 7.8 G/DL (ref 6.6–8.7)
RBC # BLD AUTO: 4.97 M/UL (ref 4.21–5.77)
SODIUM SERPL-SCNC: 136 MMOL/L (ref 136–145)
TRIGL SERPL-MCNC: 428 MG/DL
VLDLC SERPL CALC-MCNC: ABNORMAL MG/DL (ref 1–30)
WBC OTHER # BLD: 7.5 K/UL (ref 3.5–11.3)

## 2025-02-11 PROCEDURE — 80053 COMPREHEN METABOLIC PANEL: CPT

## 2025-02-11 PROCEDURE — 82570 ASSAY OF URINE CREATININE: CPT

## 2025-02-11 PROCEDURE — 85025 COMPLETE CBC W/AUTO DIFF WBC: CPT

## 2025-02-11 PROCEDURE — 83721 ASSAY OF BLOOD LIPOPROTEIN: CPT

## 2025-02-11 PROCEDURE — 82043 UR ALBUMIN QUANTITATIVE: CPT

## 2025-02-11 PROCEDURE — 80061 LIPID PANEL: CPT

## 2025-02-11 PROCEDURE — 36415 COLL VENOUS BLD VENIPUNCTURE: CPT

## 2025-02-14 ENCOUNTER — HOSPITAL ENCOUNTER (OUTPATIENT)
Dept: PHYSICAL THERAPY | Age: 50
Setting detail: THERAPIES SERIES
Discharge: HOME | End: 2025-02-14
Payer: COMMERCIAL

## 2025-02-14 PROCEDURE — 97110 THERAPEUTIC EXERCISES: CPT

## 2025-02-14 PROCEDURE — 97161 PT EVAL LOW COMPLEX 20 MIN: CPT

## 2025-02-14 ASSESSMENT — PAIN SCALES - QUEBEC BACK PAIN DISABILITY SCALE
THROW A BALL: VERY DIFFICULT
MAKE YOUR BED: SOMEWHAT DIFFICULT
RIDE IN A CAR: NOT DIFFICULT AT ALL
LIFT AND CARRY A HEAVY SUITCASE: FAIRLY DIFFICULT
WALK SEVERAL KILOMETERS  OR MILES: VERY DIFFICULT
REACH UP TO HIGH SHELVES: SOMEWHAT DIFFICULT
SIT IN A CHAIR FOR SEVERAL HOURS: NOT DIFFICULT AT ALL
STAND UP FOR 20 TO 30 MINUTES: MINIMALLY DIFFICULT
TOTAL SCORE: 36
TAKE FOOD OUT OF THE REFRIGERATOR: MINIMALLY DIFFICULT
CLIMB ONE FLIGHT OF STAIRS: SOMEWHAT DIFFICULT
WALK A FEW BLOCKS OR 300 TO 400M: FAIRLY DIFFICULT
CARRY TWO BAGS OF GROCERIES: NOT DIFFICULT AT ALL
RUN ONE BLOCK OR 100M: UNABLE TO DO
MOVE A CHAIR: MINIMALLY DIFFICULT
PULL OR PUSH HEAVY DOORS: SOMEWHAT DIFFICULT
GET OUT OF BED: MINIMALLY DIFFICULT
BEND OVER TO CLEAN THE BATHTUB: FAIRLY DIFFICULT
TURN OVER IN BED: MINIMALLY DIFFICULT
PUT ON SOCKS OR PANYHOSE: MINIMALLY DIFFICULT
SLEEP THROUGH THE NIGHT: NOT DIFFICULT AT ALL

## 2025-02-14 NOTE — PLAN OF CARE
[facility]           Phone: [phone]             Outpatient Physical Therapy  Fax: [phone]                                           Date: 2025  Patient: Isaiah Ramirez : 1975 CSN #: [id number]   Referring Physician: Serenity Shaw APRN*      [x] Plan of Care   [] Updated Plan of Care    Dates of Service to Include: 2025 to 25    Diagnosis:  R sided LBP, M54.50     Rehab (Treatment) Diagnosis:  R Abdominal wall strain; back pain         Onset Date:  25    Attendance  Total # of Visits to Date: 1 No Show: 0 Canceled Appointment: 0    Assessment  Assessment: Patient is 49 year old male with dx of R sided low back pain who presents with increased pain intermittently that is sharp and stabbing, worse with walking or rotational movements.Pt sits with fwd head and shoulders, lacking 75% of thoracic extension and also limited B thoracic rotation, L rotation worse than R. Decreased L shoulder strength: 4- to 4/5 grossly compared to R shoulder: 4 to 4+/5 grossly. Decreased core strength: 4-/5 grossly. Pt with significant hx of ventral abdominal hernia repair and R inguinal hernia repair years ago. Pt reports a lot of repetitive heavy lifting and throwing of up to 300lb bags at work and relies heavily on his R UE for this. Decreased lumbar ROM flexion: to knees with noted B HS tightness/tone. Patient to benefit from physical therapy to decrease pain and improve thoracic mobility, HS mobility, and core and L shoulder strength, and to be instructed in safe lifting techniques and equal use of B UE's to return to PLOF and decrease risk of re-injury.      Goals  Short Term Goals  Time Frame for Short Term Goals: 3 weeks  Short Term Goal 1: Patient to initiate HEP for improved thoracic mobility.  Short Term Goal 2: Patient to be instructed in core and L shoulder strengthening and thoracic

## 2025-02-14 NOTE — PROGRESS NOTES
Phone: [phone]                       [facility]          Fax: [phone]                      Outpatient Physical Therapy                                                                      Evaluation  Date: 2025  Patient: Isaiah Ramirez  : 1975  CSN #: [id number]    Referring Physician: Serenity Shaw, EMILY*    Medical Diagnosis: R sided LBP, M54.50    Treatment Diagnosis: R Abdominal wall strain; back pain  PT Insurance Information: [employer]  Total # of Visits Approved: 12   Total # of Visits to Date: 1  No Show: 0  Canceled Appointment: 0    [x] This writer acknowledges review of patient history form     Subjective  Subjective: Patient reports pain started in mid January after some lifting at work with occasional sharp shooting pain with just standing still or walking. He has not had any xrays. Pt has not tried any heat or cold packs for it or OTC meds. Better with laying down. No trouble sleeping at night d/t pain. Pt describes having to lift and throw  with rotational component of up to 300lb bags at work repeatedly and relies heavily on his R arm and side d/t L arm being weaker/nondominant.  Additional Pertinent Hx: DM, anxiety, hx of hernia surgery    Observations:   General Observations  Description: Pt sits with fwd head and shoulders, lacking 75% of thoracic extension and also limited B thoracic rotation, L rotation worse than R.    Objective    Strength       Strength LUE  L Shoulder Flexion: 4/5  L Shoulder ABduction: 4/5  L Shoulder Internal Rotation: 4/5  L Shoulder External Rotation: 4-/5  L Elbow Flexion: 4+/5  L Elbow Extension: 4/5  Strength LLE  L Hip Flexion: 4+/5  L Hip ABduction: 4-/5  L Hip ADduction: 4-/5  L Knee Flexion: 4/5  L Knee Extension: 4+/5  L Ankle Dorsiflexion: 4+/5         Lumbar Assessment     AROM Lumbar Spine   Lumbar spine general AROM: flexion: to knees, B SB: to knees no pain     Exercises:  Exercise 1: HEP: LTR, B lat thread the needle

## 2025-02-17 ENCOUNTER — TELEPHONE (OUTPATIENT)
Dept: PRIMARY CARE CLINIC | Age: 50
End: 2025-02-17

## 2025-02-17 DIAGNOSIS — E78.2 ELEVATED TRIGLYCERIDES WITH HIGH CHOLESTEROL: ICD-10-CM

## 2025-02-17 DIAGNOSIS — E78.5 HYPERLIPIDEMIA, UNSPECIFIED HYPERLIPIDEMIA TYPE: Primary | ICD-10-CM

## 2025-02-17 RX ORDER — ATORVASTATIN CALCIUM 40 MG/1
40 TABLET, FILM COATED ORAL DAILY
Qty: 90 TABLET | Refills: 0 | Status: SHIPPED | OUTPATIENT
Start: 2025-02-17

## 2025-02-17 NOTE — TELEPHONE ENCOUNTER
----- Message from EMILY Adames CNP sent at 2/17/2025  9:16 AM EST -----  Please notify patient of  lab results.  Total Cholesterol and bad cholesterol are elevated.  His Triglycerides are elevated also.  I am sending medication to the pharmacy for him to start and I recommend starting an OTC Omega 3 supplement for the triglycerides..  I recommend lifestyle modifications.  Thanks Serenity

## 2025-02-18 ENCOUNTER — HOSPITAL ENCOUNTER (OUTPATIENT)
Dept: PHYSICAL THERAPY | Age: 50
Setting detail: THERAPIES SERIES
Discharge: HOME | End: 2025-02-18
Payer: COMMERCIAL

## 2025-02-18 PROCEDURE — 97110 THERAPEUTIC EXERCISES: CPT

## 2025-02-18 NOTE — PROGRESS NOTES
Phone: [phone]                 [facility]           Fax: [phone]                           Outpatient Physical Therapy                                                                            Daily Note    Patient: Isaiah Ramirez : 1975  CSN #: [id number]   Referring Physician: Serenity Shaw APRN*  Date: 2025    Diagnosis: R sided LBP, M54.50  Treatment Diagnosis: R Abdominal wall strain; back pain    Onset Date: 25  PT Insurance Information: [employer]  Total # of Visits Approved: 12 Per Physician Order  Total # of Visits to Date: 2  No Show: 0  Canceled Appointment: 0    25 Plan of Care/Recert Due    Pre-Treatment Pain:  0/10  Subjective: Pt states he has a deep muscle pain. States pain increases with lifting at work. Currently denies pain at rest    Exercises:  Exercise 2: Bike 6 min warm up  Exercise 3: rows//extensions//cross pulls --PTB  Exercise 4: chandana cross pulldowns-- 25# 15x ea side  Exercise 5: Wall Big T stretch// face wall cross reach overhead flank stretch//thread the needle  Exercise 6: Seated thoracic foam roller stretch 10x  Exercise 7: LTR// child pose stretch    Assessment  Assessment: Pt reports his pain is minimal today, States pain normally increases when he lifts at work. Pt with TTP R LD/flank. Discomfort with twisting. Pt limited with thoracic mobolity. Pt progressed with functional strengthening and flexability exercise. HEP performed and reviewed. No increase in pain following session    Activity Tolerance  Activity Tolerance: Patient tolerated treatment well    Patient Education  Patient Education: Progression of exercise  Pt verbalized/demonstrated good understanding:     [x] Yes         [] No, pt required further clarification.       Post Treatment Pain:  0/10      Plan  Plan Frequency: 2  Plan weeks: 4-6       Goals  (Total # of Visits to Date: 2)      Short Term Goals  Time Frame for Short Term Goals: 3 weeks  Short Term Goal 1:

## 2025-02-27 ENCOUNTER — TELEPHONE (OUTPATIENT)
Dept: PRIMARY CARE CLINIC | Age: 50
End: 2025-02-27

## 2025-02-27 NOTE — TELEPHONE ENCOUNTER
Patient contacted the office in regards to huis back pain. Patient has been following up with PT for this but is not getting much relief. Patient has been going to work but after a couple hours he either has to stop working or do little tasks. Patient was supposed to have an appt with Serenity today but is unable to keep it and rescheduled for 3/3/25. Patient boss offered patient medical leave but has to get more information and the paperwork ready for him if that is the best option. Patient was wondering if Serenity could write a note to be off until he comes in for his appt on 3/3/25.     Please advise.

## 2025-03-22 LAB — NONINV COLON CA DNA+OCC BLD SCRN STL QL: NORMAL

## 2025-03-24 ENCOUNTER — RESULTS FOLLOW-UP (OUTPATIENT)
Dept: PRIMARY CARE CLINIC | Age: 50
End: 2025-03-24

## 2025-03-24 NOTE — TELEPHONE ENCOUNTER
Patient states he never received the kit and [facility] but has not reached back out to them .

## 2025-03-24 NOTE — TELEPHONE ENCOUNTER
----- Message from EMILY Adames CNP sent at 3/24/2025  8:34 AM EDT -----  Please notify patient and ask if he was aware he sent in an empty sample kit?.  Thanks Serenity

## 2025-04-28 ENCOUNTER — TELEPHONE (OUTPATIENT)
Dept: PRIMARY CARE CLINIC | Age: 50
End: 2025-04-28

## 2025-04-28 NOTE — TELEPHONE ENCOUNTER
Isaiah was a No Show for his appointment today.  This is his 3rd No Show within a 12 month period (actually 2 months!)    Do you want to send the 3rd warning letter or discharge Isaiah from practice?    Please advise, thank you!

## (undated) DEVICE — 40586 ADVANCED TRENDELENBURG POSITIONING KIT: Brand: 40586 ADVANCED TRENDELENBURG POSITIONING KIT

## (undated) DEVICE — Device

## (undated) DEVICE — ELECTRO LUBE IS A SINGLE PATIENT USE DEVICE THAT IS INTENDED TO BE USED ON ELECTROSURGICAL ELECTRODES TO REDUCE STICKING.: Brand: KEY SURGICAL ELECTRO LUBE

## (undated) DEVICE — MASTISOL ADHESIVE LIQ 2/3ML

## (undated) DEVICE — STRIP,CLOSURE,WOUND,MEDI-STRIP,1/2X4: Brand: MEDLINE

## (undated) DEVICE — ENDOSCOPIC KIT CLN SWAB MICROFIBER CLTH SCP CLEANOR DISP

## (undated) DEVICE — SOLUTION IV IRRIG POUR BRL 0.9% SODIUM CHL 2F7124

## (undated) DEVICE — BLADELESS OBTURATOR: Brand: WECK VISTA

## (undated) DEVICE — ABDOMINAL BINDER: Brand: DEROYAL

## (undated) DEVICE — GAMMEX® NON-LATEX PI ORTHO SIZE 7, STERILE POLYISOPRENE POWDER-FREE SURGICAL GLOVE: Brand: GAMMEX

## (undated) DEVICE — SUTURE BARB NON ABSORBABLE V LOC PBT BLU SZ 0 LEN 9 IN GS 21 VLOCN0346

## (undated) DEVICE — SUTURE DEV SZ 0 L6IN N ABSORBABLE

## (undated) DEVICE — WARMER SCP 2 ANTIFOG LAP DISP

## (undated) DEVICE — SYRINGE MED 10ML LUERLOCK TIP W/O SFTY DISP

## (undated) DEVICE — CANNULA SEAL

## (undated) DEVICE — PLUMEPORT LAPAROSCOPIC SMOKE FILTRATION DEVICE: Brand: PLUMEPORT ACTIV

## (undated) DEVICE — ARM DRAPE

## (undated) DEVICE — DRAPE,UTILTY,TAPE,15X26, 4EA/PK: Brand: MEDLINE

## (undated) DEVICE — SUTURE VCRL + SZ 3-0 L27IN ABSRB UD L26MM SH 1/2 CIR VCP416H

## (undated) DEVICE — PENCIL ES L3M BTTN SWCH HOLSTER W/ BLDE ELECTRD EDGE

## (undated) DEVICE — BLADELESS OBTURATOR, LONG: Brand: WECK VISTA

## (undated) DEVICE — SUTURE DEV SZ 2-0 WND CLSR ABSRB GS-22 VLOC COVIDIEN VLOCM2145

## (undated) DEVICE — SHEET,DRAPE,53X77,STERILE: Brand: MEDLINE

## (undated) DEVICE — SUTURE MCRYL + SZ 4 0 L18IN ABSRB UD PC 3 L16MM 3 8 CIR PRIM MCP845G